# Patient Record
Sex: FEMALE | Race: WHITE | NOT HISPANIC OR LATINO | Employment: PART TIME | URBAN - METROPOLITAN AREA
[De-identification: names, ages, dates, MRNs, and addresses within clinical notes are randomized per-mention and may not be internally consistent; named-entity substitution may affect disease eponyms.]

---

## 2017-10-05 ENCOUNTER — HOSPITAL ENCOUNTER (EMERGENCY)
Facility: CLINIC | Age: 47
Discharge: HOME | End: 2017-10-05
Attending: EMERGENCY MEDICINE | Admitting: EMERGENCY MEDICINE
Payer: COMMERCIAL

## 2017-10-05 ENCOUNTER — APPOINTMENT (OUTPATIENT)
Dept: GENERAL RADIOLOGY | Facility: CLINIC | Age: 47
End: 2017-10-05
Attending: EMERGENCY MEDICINE
Payer: COMMERCIAL

## 2017-10-05 VITALS
WEIGHT: 293 LBS | BODY MASS INDEX: 49.13 KG/M2 | SYSTOLIC BLOOD PRESSURE: 147 MMHG | RESPIRATION RATE: 18 BRPM | DIASTOLIC BLOOD PRESSURE: 86 MMHG | HEART RATE: 88 BPM | OXYGEN SATURATION: 96 % | TEMPERATURE: 98.3 F

## 2017-10-05 DIAGNOSIS — R07.9 CHEST PAIN, UNSPECIFIED TYPE: ICD-10-CM

## 2017-10-05 DIAGNOSIS — R03.0 ELEVATED BLOOD PRESSURE READING WITHOUT DIAGNOSIS OF HYPERTENSION: ICD-10-CM

## 2017-10-05 LAB
ANION GAP SERPL CALCULATED.3IONS-SCNC: 9 MMOL/L (ref 3–14)
BUN SERPL-MCNC: 12 MG/DL (ref 7–30)
CALCIUM SERPL-MCNC: 8.9 MG/DL (ref 8.5–10.1)
CHLORIDE SERPL-SCNC: 107 MMOL/L (ref 94–109)
CO2 SERPL-SCNC: 25 MMOL/L (ref 20–32)
CREAT SERPL-MCNC: 0.71 MG/DL (ref 0.52–1.04)
D DIMER PPP FEU-MCNC: 0.3 UG/ML FEU (ref 0–0.5)
ERYTHROCYTE [DISTWIDTH] IN BLOOD BY AUTOMATED COUNT: 12 % (ref 10–15)
GFR SERPL CREATININE-BSD FRML MDRD: 88 ML/MIN/1.7M2
GLUCOSE SERPL-MCNC: 106 MG/DL (ref 70–99)
HCG SERPL QL: NEGATIVE
HCT VFR BLD AUTO: 42.7 % (ref 35–47)
HGB BLD-MCNC: 14.7 G/DL (ref 11.7–15.7)
INTERPRETATION ECG - MUSE: NORMAL
MCH RBC QN AUTO: 34.2 PG (ref 26.5–33)
MCHC RBC AUTO-ENTMCNC: 34.4 G/DL (ref 31.5–36.5)
MCV RBC AUTO: 99 FL (ref 78–100)
PLATELET # BLD AUTO: 240 10E9/L (ref 150–450)
POTASSIUM SERPL-SCNC: 3.9 MMOL/L (ref 3.4–5.3)
RBC # BLD AUTO: 4.3 10E12/L (ref 3.8–5.2)
SODIUM SERPL-SCNC: 141 MMOL/L (ref 133–144)
TROPONIN I SERPL-MCNC: <0.015 UG/L (ref 0–0.04)
WBC # BLD AUTO: 7.5 10E9/L (ref 4–11)

## 2017-10-05 PROCEDURE — 84484 ASSAY OF TROPONIN QUANT: CPT | Performed by: EMERGENCY MEDICINE

## 2017-10-05 PROCEDURE — 80048 BASIC METABOLIC PNL TOTAL CA: CPT | Performed by: EMERGENCY MEDICINE

## 2017-10-05 PROCEDURE — 85027 COMPLETE CBC AUTOMATED: CPT | Performed by: EMERGENCY MEDICINE

## 2017-10-05 PROCEDURE — 93005 ELECTROCARDIOGRAM TRACING: CPT

## 2017-10-05 PROCEDURE — 84703 CHORIONIC GONADOTROPIN ASSAY: CPT | Performed by: EMERGENCY MEDICINE

## 2017-10-05 PROCEDURE — 85379 FIBRIN DEGRADATION QUANT: CPT | Performed by: EMERGENCY MEDICINE

## 2017-10-05 PROCEDURE — 99285 EMERGENCY DEPT VISIT HI MDM: CPT | Mod: 25

## 2017-10-05 PROCEDURE — 71020 XR CHEST 2 VW: CPT

## 2017-10-05 RX ORDER — LORATADINE 10 MG/1
10 TABLET ORAL DAILY
COMMUNITY

## 2017-10-05 NOTE — ED PROVIDER NOTES
History     Chief Complaint:  Chest pain    HPI   Shital Krueger is a 47 year old female who presents with right sided chest pain x 2 weeks. Patient states that she had the gradual onset of right sided chest pain in the absence of any other inciting event. The pain radiates from the right chest to the right axilla. The pain is sharp and relatively constant. There are no obvious aggravating or alleviating factors other than a very specific any area of tenderness to touch. She has no associated dyspnea or nausea. She has no history of DVT/PE, immobilization, malignancy, hemoptysis, estrogen use or malignancy.    Allergies:  Penicillins      Medications:    Claritin    Past Medical History:    The patient does not have any past pertinent medical history.     Past Surgical History:    History reviewed. No pertinent surgical history.     Family History:    Glaucoma  Thyroid disease  Hyperlipidemia  Heart failure    Social History:  Smoking status: Never smoker  Alcohol use: Yes    Marital Status:        Review of Systems   Constitutional: Negative for fever.   Respiratory: Negative for shortness of breath.    Cardiovascular: Negative for leg swelling.   Gastrointestinal: Negative for nausea and vomiting.   Skin: Negative for rash.   All other systems reviewed and are negative.    10 point review of systems performed and is negative except as above and in HPI.       Physical Exam     Patient Vitals for the past 24 hrs:   BP Temp Pulse Heart Rate Resp SpO2 Weight   10/05/17 1330 147/86 - 88 88 18 96 % -   10/05/17 1121 (!) 180/104 98.3  F (36.8  C) 100 - 20 98 % 132.9 kg (293 lb)        Physical Exam    General:   Pleasant, age appropriate.  HEENT:    Oropharynx is moist, without lesions or trismus.  Eyes:    Conjunctiva normal  Neck:    Supple, no meningismus.     CV:     Regular rate and rhythm.      No murmurs, rubs or gallops.       No JVD or unilateral leg swelling.       2+ radial pulses bilateral.        No lower extremity edema.  PULM:    Clear to auscultation bilateral.       No respiratory distress.      Good air exchange.     No rales or wheezing.     Moderate focal tenderness to the chest wall at the right 8th rib at the anterior axillary line that reproduces her pain.  ABD:    Soft, non-tender, non-distended.       No pulsatile masses.       No rebound, guarding or rigidity.  MSK:     No gross deformity to all four extremities.   LYMPH:   No cervical lymphadenopathy.  NEURO:   Alert. Good muscle tone, no atrophy.  Skin:    Warm, dry and intact.    Psych:    Mood is good and affect is appropriate.        Emergency Department Course     ECG (11:31:44):  Rate 89 bpm. WV interval 138. QRS duration 84. QT/QTc 348/423. P-R-T axes 54 0 29. Sinus rhythm. Possible inferior infarct. Interpreted by Dr. Marks.    Imaging:  Radiographic findings were communicated with the patient who voiced understanding of the findings.    X-ray Chest, 2 views:  Negative.  Result per radiology.     Laboratory:  1201 - Troponin: <0.015   CBC: WNL (WBC 7.5, HGB 14.7, )    BMP: Glucose 106 (H), o/w WNL (Creatinine 0.71)   D-dimer: 0.3  HCG: Negative    Emergency Department Course:  Past medical records, nursing notes, and vitals reviewed.  I performed an exam of the patient and obtained history, as documented above.   IV inserted and blood drawn.     The patient was sent for a X-ray while in the emergency department, findings above.     I rechecked the patient. Findings and plan explained to the Patient. Patient discharged home with instructions regarding supportive care, medications, and reasons to return. The importance of close follow-up was reviewed.      Impression & Plan      Medical Decision Makin yo F who presents with a 2 week history of right sided chest pain and reproducible pain on examination. Evaluation is negative for ACS. Low risk for PE and negative D dimer thus no indication for chest CT. Nothing to  suggest aortic dissection or aneurysm.  Given the reproducible pain, likely represents musculoskeletal source. Patient safe for discharge and close follow up with PCP.    Diagnosis:    ICD-10-CM    1. Chest pain, unspecified type R07.9    2. Elevated blood pressure reading without diagnosis of hypertension R03.0        Yang Sow  10/5/2017   [facility] EMERGENCY DEPARTMENT  I, Yang Sow, am serving as a scribe at 2:18 PM on 10/5/2017 to document services personally performed by Dr. Marks based on my observations and the provider's statements to me.       Collins Marks MD  10/09/17 1051

## 2017-10-05 NOTE — ED AVS SNAPSHOT
[facility] Emergency Department    [address]    [facility] [address]    Phone:  [phone]    Fax:  [phone]                                       Shital Krueger   MRN: [id number]    Department:  [facility] Emergency Department   Date of Visit:  10/5/2017           After Visit Summary Signature Page     I have received my discharge instructions, and my questions have been answered. I have discussed any challenges I see with this plan with the nurse or doctor.    ..........................................................................................................................................  Patient/Patient Representative Signature      ..........................................................................................................................................  Patient Representative Print Name and Relationship to Patient    ..................................................               ................................................  Date                                            Time    ..........................................................................................................................................  Reviewed by Signature/Title    ...................................................              ..............................................  Date                                                            Time

## 2017-10-05 NOTE — ED NOTES
Reviewed discharge instructions with pt, IV access discontinued, pt verbalized understanding and denied any further questions, pt discharged.

## 2017-10-05 NOTE — DISCHARGE INSTRUCTIONS
Discharge Instructions  Chest Pain    You have been seen today for chest pain or discomfort.  At this time, your provider has found no signs that your chest pain is due to a serious or life-threatening condition, (or you have declined more testing and/or admission to the hospital). However, sometimes there is a serious problem that does not show up right away. Your evaluation today may not be complete and you may need further testing and evaluation.     Generally, every Emergency Department visit should have a follow-up clinic visit with either a primary or a specialty clinic/provider. Please follow-up as instructed by your emergency provider today.  Return to the Emergency Department if:    Your chest pain changes, gets worse, starts to happen more often, or comes with less activity.    You are newly short of breath.    You get very weak or tired.    You pass out or faint.    You have any new symptoms, like fever, cough, numb legs, or you cough up blood.    You have anything else that worries you.    Until you follow-up with your regular provider, please do the following:    Take one aspirin daily unless you have an allergy or are told not to by your provider.    If a stress test appointment has been made, go to the appointment.    If you have questions, contact your regular provider.    Follow-up with your regular provider/clinic as directed; this is very important.    If you were given a prescription for medicine here today, be sure to read all of the information (including the package insert) that comes with your prescription.  This will include important information about the medicine, its side effects, and any warnings that you need to know about.  The pharmacist who fills the prescription can provide more information and answer questions you may have about the medicine.  If you have questions or concerns that the pharmacist cannot address, please call or return to the Emergency Department.       Remember that  you can always come back to the Emergency Department if you are not able to see your regular provider in the amount of time listed above, if you get any new symptoms, or if there is anything that worries you.  Discharge Instructions  Hypertension - High Blood Pressure    During you visit to the Emergency Department, your blood pressure was higher than the recommended blood pressure.  This may be related to stress, pain, medication or other temporary conditions. In these cases, your blood pressure may return to normal on its own. If you have a history of high blood pressure, you may need to have your provider adjust your medications. Sometimes, your high measurement here may indicate that you have developed high blood pressure that will stay high unless it is treated. As a general rule, high blood pressure causes problems over years rather than days, weeks, or months. So, while it is important to treat blood pressure, it is rarely important to treat blood pressure immediately. Occasionally we will begin a medication in the Emergency Department; more often we will recommend close follow-up for medications with a primary doctor/clinic.    Generally, every Emergency Department visit should have a follow-up clinic visit with either a primary or a specialty clinic/provider. Please follow-up as instructed by your emergency provider today.    Return to the Emergency Department if you start to have:    A severe headache.    Chest pain.    Shortness of breath.    Weakness or numbness that affects one part of the body.    Confusion.    Vision changes.    Significant swelling of legs and/or eyes.    A reaction to any medication started in the Emergency Department.    What can I do to help myself?    Avoid alcohol.    Take any blood pressure medicine that you are prescribed.    Get a good night s sleep.    Lower your salt intake.    Exercise.    Lose weight.    Manage stress.    See your doctor regularly    If blood pressure  medication was started in the Emergency Department:    The medicine may not have an immediate effect. The body and brain determine what blood pressure you have. The medicine s job is to retrain the body s  thermostat  to a lower blood pressure.    You will need to follow up with your provider to see how this medicine is working for you.  If you were given a prescription for medicine here today, be sure to read all of the information (including the package insert) that comes with your prescription.  This will include important information about the medicine, its side effects, and any warnings that you need to know about.  The pharmacist who fills the prescription can provide more information and answer questions you may have about the medicine.  If you have questions or concerns that the pharmacist cannot address, please call or return to the Emergency Department.   Remember that you can always come back to the Emergency Department if you are not able to see your regular provider in the amount of time listed above, if you get any new symptoms, or if there is anything that worries you.

## 2017-10-05 NOTE — ED AVS SNAPSHOT
[facility] Emergency Department    [address]    [facility] [address]    Phone:  [phone]    Fax:  [phone]                                       Shital Krueger   MRN: [id number]    Department:  [facility] Emergency Department   Date of Visit:  10/5/2017           Patient Information     Date Of Birth          1970        Your diagnoses for this visit were:     Chest pain, unspecified type     Elevated blood pressure reading without diagnosis of hypertension        You were seen by Collins Marks MD.      Follow-up Information     Follow up with Cosmo Esteves DO. Schedule an appointment as soon as possible for a visit in 4 days.    Specialty:  Family Practice    Contact information:    [facility]  [id number]  [facility] [address] [phone]          Discharge Instructions       Discharge Instructions  Chest Pain    You have been seen today for chest pain or discomfort.  At this time, your provider has found no signs that your chest pain is due to a serious or life-threatening condition, (or you have declined more testing and/or admission to the hospital). However, sometimes there is a serious problem that does not show up right away. Your evaluation today may not be complete and you may need further testing and evaluation.     Generally, every Emergency Department visit should have a follow-up clinic visit with either a primary or a specialty clinic/provider. Please follow-up as instructed by your emergency provider today.  Return to the Emergency Department if:    Your chest pain changes, gets worse, starts to happen more often, or comes with less activity.    You are newly short of breath.    You get very weak or tired.    You pass out or faint.    You have any new symptoms, like fever, cough, numb legs, or you cough up blood.    You have anything else that worries you.    Until you follow-up with your regular  provider, please do the following:    Take one aspirin daily unless you have an allergy or are told not to by your provider.    If a stress test appointment has been made, go to the appointment.    If you have questions, contact your regular provider.    Follow-up with your regular provider/clinic as directed; this is very important.    If you were given a prescription for medicine here today, be sure to read all of the information (including the package insert) that comes with your prescription.  This will include important information about the medicine, its side effects, and any warnings that you need to know about.  The pharmacist who fills the prescription can provide more information and answer questions you may have about the medicine.  If you have questions or concerns that the pharmacist cannot address, please call or return to the Emergency Department.       Remember that you can always come back to the Emergency Department if you are not able to see your regular provider in the amount of time listed above, if you get any new symptoms, or if there is anything that worries you.  Discharge Instructions  Hypertension - High Blood Pressure    During you visit to the Emergency Department, your blood pressure was higher than the recommended blood pressure.  This may be related to stress, pain, medication or other temporary conditions. In these cases, your blood pressure may return to normal on its own. If you have a history of high blood pressure, you may need to have your provider adjust your medications. Sometimes, your high measurement here may indicate that you have developed high blood pressure that will stay high unless it is treated. As a general rule, high blood pressure causes problems over years rather than days, weeks, or months. So, while it is important to treat blood pressure, it is rarely important to treat blood pressure immediately. Occasionally we will begin a medication in the Emergency  Department; more often we will recommend close follow-up for medications with a primary doctor/clinic.    Generally, every Emergency Department visit should have a follow-up clinic visit with either a primary or a specialty clinic/provider. Please follow-up as instructed by your emergency provider today.    Return to the Emergency Department if you start to have:    A severe headache.    Chest pain.    Shortness of breath.    Weakness or numbness that affects one part of the body.    Confusion.    Vision changes.    Significant swelling of legs and/or eyes.    A reaction to any medication started in the Emergency Department.    What can I do to help myself?    Avoid alcohol.    Take any blood pressure medicine that you are prescribed.    Get a good night s sleep.    Lower your salt intake.    Exercise.    Lose weight.    Manage stress.    See your doctor regularly    If blood pressure medication was started in the Emergency Department:    The medicine may not have an immediate effect. The body and brain determine what blood pressure you have. The medicine s job is to retrain the body s  thermostat  to a lower blood pressure.    You will need to follow up with your provider to see how this medicine is working for you.  If you were given a prescription for medicine here today, be sure to read all of the information (including the package insert) that comes with your prescription.  This will include important information about the medicine, its side effects, and any warnings that you need to know about.  The pharmacist who fills the prescription can provide more information and answer questions you may have about the medicine.  If you have questions or concerns that the pharmacist cannot address, please call or return to the Emergency Department.   Remember that you can always come back to the Emergency Department if you are not able to see your regular provider in the amount of time listed above, if you get any new  symptoms, or if there is anything that worries you.      24 Hour Appointment Hotline       To make an appointment at any [facility], call [phone] ([phone]). If you don't have a family doctor or clinic, we will help you find one. [facility] clinics are conveniently located to serve the needs of you and your family.             Review of your medicines      Our records show that you are taking the medicines listed below. If these are incorrect, please call your family doctor or clinic.        Dose / Directions Last dose taken    * JOLIVETTE 0.35 MG per tablet   Dose:  1 tablet   Generic drug:  norethindrone        Take 1 tablet by mouth daily   Refills:  3        * norethindrone 0.35 MG per tablet   Commonly known as:  MICRONOR   Dose:  1 tablet   Quantity:  84 tablet        Take 1 tablet (0.35 mg) by mouth daily   Refills:  1        LO/OVRAL (21) OR        Refills:  0        loratadine 10 MG tablet   Commonly known as:  CLARITIN   Dose:  10 mg        Take 10 mg by mouth daily   Refills:  0        metroNIDAZOLE 0.75 % cream   Commonly known as:  METROCREAM        Refills:  6        * Notice:  This list has 2 medication(s) that are the same as other medications prescribed for you. Read the directions carefully, and ask your doctor or other care provider to review them with you.            Procedures and tests performed during your visit     Basic metabolic panel (BMP)    CBC (platelets, no diff)    Chest XR,  PA & LAT    D dimer quantitative    EKG 12 lead    HCG QUALitative pregnancy (blood)    Peripheral IV catheter    Troponin I      Orders Needing Specimen Collection     None      Pending Results     No orders found from 10/3/2017 to 10/6/2017.            Pending Culture Results     No orders found from 10/3/2017 to 10/6/2017.            Pending Results Instructions     If you had any lab results that were not finalized at the time of your Discharge, you can call the ED Lab Result RN at  [phone]. You will be contacted by this team for any positive Lab results or changes in treatment. The nurses are available 7 days a week from 10A to 6:30P.  You can leave a message 24 hours per day and they will return your call.        Test Results From Your Hospital Stay        10/5/2017 12:13 PM      Component Results     Component Value Ref Range & Units Status    WBC 7.5 4.0 - 11.0 10e9/L Final    RBC Count 4.30 3.8 - 5.2 10e12/L Final    Hemoglobin 14.7 11.7 - 15.7 g/dL Final    Hematocrit 42.7 35.0 - 47.0 % Final    MCV 99 78 - 100 fl Final    MCH 34.2 (H) 26.5 - 33.0 pg Final    MCHC 34.4 31.5 - 36.5 g/dL Final    RDW 12.0 10.0 - 15.0 % Final    Platelet Count 240 150 - 450 10e9/L Final         10/5/2017 12:32 PM      Component Results     Component Value Ref Range & Units Status    Sodium 141 133 - 144 mmol/L Final    Potassium 3.9 3.4 - 5.3 mmol/L Final    Chloride 107 94 - 109 mmol/L Final    Carbon Dioxide 25 20 - 32 mmol/L Final    Anion Gap 9 3 - 14 mmol/L Final    Glucose 106 (H) 70 - 99 mg/dL Final    Urea Nitrogen 12 7 - 30 mg/dL Final    Creatinine 0.71 0.52 - 1.04 mg/dL Final    GFR Estimate 88 >60 mL/min/1.7m2 Final    Non  GFR Calc    GFR Estimate If Black >90 >60 mL/min/1.7m2 Final    African American GFR Calc    Calcium 8.9 8.5 - 10.1 mg/dL Final         10/5/2017 12:32 PM      Component Results     Component Value Ref Range & Units Status    D Dimer 0.3 0.0 - 0.50 ug/ml FEU Final    This D-dimer assay is intended for use in conjunction with a clinical pretest   probability assessment model to exclude pulmonary embolism (PE) and deep   venous thrombosis (DVT) in outpatients suspected of PE or DVT. The cut-off   value is 0.5 ug/mL FEU.           10/5/2017 12:38 PM      Component Results     Component Value Ref Range & Units Status    HCG Qualitative Serum Negative NEG^Negative Final    This test is for screening purposes.  Results should be interpreted along with   the  clinical picture.  Confirmation testing is available if warranted by   ordering VRF285, HCG Quantitative Pregnancy.           10/5/2017 12:32 PM      Component Results     Component Value Ref Range & Units Status    Troponin I ES <0.015 0.000 - 0.045 ug/L Final    The 99th percentile for upper reference range is 0.045 ug/L.  Troponin values   in the range of 0.045 - 0.120 ug/L may be associated with risks of adverse   clinical events.           10/5/2017  1:14 PM      Narrative     XR CHEST 2 VW 10/5/2017 1:13 PM    HISTORY: chest pain        Impression     IMPRESSION: Negative.    PEDRO LUIS HERRERA MD                Clinical Quality Measure: Blood Pressure Screening     Your blood pressure was checked while you were in the emergency department today. The last reading we obtained was  BP: (!) 180/104 . Please read the guidelines below about what these numbers mean and what you should do about them.  If your systolic blood pressure (the top number) is less than 120 and your diastolic blood pressure (the bottom number) is less than 80, then your blood pressure is normal. There is nothing more that you need to do about it.  If your systolic blood pressure (the top number) is 120-139 or your diastolic blood pressure (the bottom number) is 80-89, your blood pressure may be higher than it should be. You should have your blood pressure rechecked within a year by a primary care provider.  If your systolic blood pressure (the top number) is 140 or greater or your diastolic blood pressure (the bottom number) is 90 or greater, you may have high blood pressure. High blood pressure is treatable, but if left untreated over time it can put you at risk for heart attack, stroke, or kidney failure. You should have your blood pressure rechecked by a primary care provider within the next 4 weeks.  If your provider in the emergency department today gave you specific instructions to follow-up with your doctor or provider even sooner than  "that, you should follow that instruction and not wait for up to 4 weeks for your follow-up visit.        Thank you for choosing [facility]       Thank you for choosing [facility] for your care. Our goal is always to provide you with excellent care. Hearing back from our patients is one way we can continue to improve our services. Please take a few minutes to complete the written survey that you may receive in the mail after you visit with us. Thank you!        [facility] Information     [facility] lets you send messages to your doctor, view your test results, renew your prescriptions, schedule appointments and more. To sign up, go to [email or web address] . Click on \"Log in\" on the left side of the screen, which will take you to the Welcome page. Then click on \"Sign up Now\" on the right side of the page.     You will be asked to enter the access code listed below, as well as some personal information. Please follow the directions to create your username and password.     Your access code is: [email or web address]  Expires: 1/3/2018  1:30 PM     Your access code will  in 90 days. If you need help or a new code, please call your [facility] clinic or [phone].        Care EveryWhere ID     This is your Care EveryWhere ID. This could be used by other organizations to access your [facility] medical records  [id number]        Equal Access to Services     SY KELLEY : eKlsi Sandoval, waaxda luqadaha, qaybta kaalnevaeh montiel, zac ryder. So [facility] [phone].    ATENCIÓN: Si habla español, tiene a simental disposición servicios gratuitos de asistencia lingüística. Markel al [phone].    We comply with applicable federal civil rights laws and [place] laws. We do not discriminate on the basis of race, color, national origin, age, disability, sex, sexual orientation, or gender identity.            After Visit Summary       This is your record. Keep this with you and show to your " community pharmacist(s) and doctor(s) at your next visit.

## 2017-10-09 ASSESSMENT — ENCOUNTER SYMPTOMS
FEVER: 0
SHORTNESS OF BREATH: 0
VOMITING: 0
NAUSEA: 0

## 2017-12-12 ENCOUNTER — TRANSFERRED RECORDS (OUTPATIENT)
Dept: HEALTH INFORMATION MANAGEMENT | Facility: CLINIC | Age: 47
End: 2017-12-12

## 2017-12-15 ENCOUNTER — TRANSFERRED RECORDS (OUTPATIENT)
Dept: HEALTH INFORMATION MANAGEMENT | Facility: CLINIC | Age: 47
End: 2017-12-15

## 2017-12-29 ENCOUNTER — OFFICE VISIT (OUTPATIENT)
Dept: OBGYN | Facility: CLINIC | Age: 47
End: 2017-12-29
Payer: COMMERCIAL

## 2017-12-29 VITALS
WEIGHT: 258 LBS | DIASTOLIC BLOOD PRESSURE: 78 MMHG | HEART RATE: 80 BPM | HEIGHT: 65 IN | SYSTOLIC BLOOD PRESSURE: 122 MMHG | BODY MASS INDEX: 42.99 KG/M2

## 2017-12-29 DIAGNOSIS — Z30.011 OCP (ORAL CONTRACEPTIVE PILLS) INITIATION: ICD-10-CM

## 2017-12-29 DIAGNOSIS — Z01.419 ENCOUNTER FOR GYNECOLOGICAL EXAMINATION WITHOUT ABNORMAL FINDING: Primary | ICD-10-CM

## 2017-12-29 PROCEDURE — 99396 PREV VISIT EST AGE 40-64: CPT | Performed by: OBSTETRICS & GYNECOLOGY

## 2017-12-29 PROCEDURE — 87624 HPV HI-RISK TYP POOLED RSLT: CPT | Performed by: OBSTETRICS & GYNECOLOGY

## 2017-12-29 PROCEDURE — G0145 SCR C/V CYTO,THINLAYER,RESCR: HCPCS | Performed by: OBSTETRICS & GYNECOLOGY

## 2017-12-29 RX ORDER — ACETAMINOPHEN AND CODEINE PHOSPHATE 120; 12 MG/5ML; MG/5ML
1 SOLUTION ORAL DAILY
Qty: 90 TABLET | Refills: 3 | Status: SHIPPED | OUTPATIENT
Start: 2017-12-29 | End: 2022-02-15

## 2017-12-29 ASSESSMENT — ANXIETY QUESTIONNAIRES
5. BEING SO RESTLESS THAT IT IS HARD TO SIT STILL: NOT AT ALL
2. NOT BEING ABLE TO STOP OR CONTROL WORRYING: NOT AT ALL
6. BECOMING EASILY ANNOYED OR IRRITABLE: SEVERAL DAYS
1. FEELING NERVOUS, ANXIOUS, OR ON EDGE: NOT AT ALL
IF YOU CHECKED OFF ANY PROBLEMS ON THIS QUESTIONNAIRE, HOW DIFFICULT HAVE THESE PROBLEMS MADE IT FOR YOU TO DO YOUR WORK, TAKE CARE OF THINGS AT HOME, OR GET ALONG WITH OTHER PEOPLE: NOT DIFFICULT AT ALL
3. WORRYING TOO MUCH ABOUT DIFFERENT THINGS: NOT AT ALL
7. FEELING AFRAID AS IF SOMETHING AWFUL MIGHT HAPPEN: NOT AT ALL
GAD7 TOTAL SCORE: 1

## 2017-12-29 ASSESSMENT — PATIENT HEALTH QUESTIONNAIRE - PHQ9
SUM OF ALL RESPONSES TO PHQ QUESTIONS 1-9: 2
5. POOR APPETITE OR OVEREATING: NOT AT ALL

## 2017-12-29 NOTE — PROGRESS NOTES
Shital is a 47 year old No obstetric history on file. female who presents for annual exam.     Besides routine health maintenance, she has no other health concerns today .    HPI:  The patient's PCP is  Cosmo Esteves DO.    No GYN complaints. Started diet and lost 40lbs.  with high TGL so eating at home has changed.  Needs birth control. Hx of micronor. Interested in going back on. Doesn't know if it was causing any weight gain.        GYNECOLOGIC HISTORY:    Patient's last menstrual period was 12/11/2017.  Her current contraception method is: condoms.  She  reports that she has never smoked. She has never used smokeless tobacco.      Patient is sexually active.  STD testing offered?  Declined  Last PHQ-9 score on record =   PHQ-9 SCORE 12/29/2017   Total Score 2     Last GAD7 score on record =   SHERRY-7 SCORE 12/29/2017   Total Score 1     Alcohol Score = rare use    HEALTH MAINTENANCE:  Cholesterol: done at PCP  Last Mammo: 2 years ago, Result: normal, Next Mammo: 1 year.  Pap: (  Lab Results   Component Value Date    PAP NIL 11/02/2015     Colonoscopy:  never, Result: not applicable, Next Colonoscopy: age 51.  Dexa:  never    Health maintenance updated:  yes    HISTORY:  Obstetric History     No data available          Patient Active Problem List   Diagnosis   (none) - all problems resolved or deleted     No past surgical history on file.   Social History   Substance Use Topics     Smoking status: Never Smoker     Smokeless tobacco: Never Used     Alcohol use Yes      Problem (# of Occurrences) Relation (Name,Age of Onset)    Breast Cancer (1) Cousin    CEREBROVASCULAR DISEASE (1) Maternal Grandmother    Glaucoma (1) Mother    Heart Failure (1) Father    Hyperlipidemia (2) Mother, Father    Other Cancer (1) Cousin    Ovarian Cancer (1) Cousin    Thyroid Disease (1) Mother            Current Outpatient Prescriptions   Medication Sig     norethindrone (MICRONOR) 0.35 MG per tablet Take 1 tablet (0.35  "mg) by mouth daily     loratadine (CLARITIN) 10 MG tablet Take 10 mg by mouth daily     metroNIDAZOLE (METROCREAM) 0.75 % cream      [DISCONTINUED] norethindrone (MICRONOR) 0.35 MG per tablet Take 1 tablet (0.35 mg) by mouth daily     [DISCONTINUED] JOLIVETTE 0.35 MG tablet Take 1 tablet by mouth daily     No current facility-administered medications for this visit.      Allergies   Allergen Reactions     Penicillins        Past medical, surgical, social and family histories were reviewed and updated in EPIC.    ROS:   12 point review of systems negative other than symptoms noted below.    EXAM:  /78  Pulse 80  Ht 5' 4.75\" (1.645 m)  Wt 258 lb (117 kg)  LMP 12/11/2017  BMI 43.27 kg/m2   BMI: Body mass index is 43.27 kg/(m^2).    PHYSICAL EXAM:  Constitutional:  Appearance: Well nourished, well developed, alert, in no acute distress  Neck:  Lymph Nodes:  No lymphadenopathy present    Thyroid:  Gland size normal, nontender, no nodules or masses present  on palpation  Chest:  Respiratory Effort:  Breathing unlabored  Cardiovascular:    Heart: Auscultation:  Regular rate, normal rhythm, no murmurs present  Breasts: Inspection of Breasts:  No lymphadenopathy present., Palpation of Breasts and Axillae:  No masses present on palpation, no breast tenderness., Axillary Lymph Nodes:  No lymphadenopathy present. and No nodularity, asymmetry or nipple discharge bilaterally.  Gastrointestinal:   Abdominal Examination:  Abdomen nontender to palpation, tone normal without rigidity or guarding, no masses present, umbilicus without lesions   Liver and Spleen:  No hepatomegaly present, liver nontender to palpation    Hernias:  No hernias present  Lymphatic: Lymph Nodes:  No other lymphadenopathy present  Skin:  General Inspection:  No rashes present, no lesions present, no areas of  discoloration    Genitalia and Groin:  No rashes present, no lesions present, no areas of  discoloration, no masses " present  Neurologic/Psychiatric:    Mental Status:  Oriented X3     Pelvic Exam:  External Genitalia:     Normal appearance for age, no discharge present, no tenderness present, no inflammatory lesions present, color normal  Vagina:     Normal vaginal vault without central or paravaginal defects, no discharge present, no inflammatory lesions present, no masses present  Bladder:     Nontender to palpation  Urethra:   Urethral Body:  Urethra palpation normal, urethra structural support normal   Urethral Meatus:  No erythema or lesions present  Cervix:     Appearance healthy, no lesions present, nontender to palpation, no bleeding present  Uterus:     Uterus: firm, normal sized and nontender, anteverted in position.   Adnexa:     No adnexal tenderness present, no adnexal masses present  Perineum:     Perineum within normal limits, no evidence of trauma, no rashes or skin lesions present  Anus:     Anus within normal limits, no hemorrhoids present  Inguinal Lymph Nodes:     No lymphadenopathy present  Pubic Hair:     Normal pubic hair distribution for age  Genitalia and Groin:     No rashes present, no lesions present, no areas of discoloration, no masses present      COUNSELING:   Reviewed preventive health counseling, as reflected in patient instructions       Regular exercise    BMI: Body mass index is 43.27 kg/(m^2).  Weight management plan: Encouraged continued weight loss    ASSESSMENT:  47 year old female with satisfactory annual exam.    ICD-10-CM    1. Encounter for gynecological examination without abnormal finding Z01.419 Pap imaged thin layer screen with HPV - recommended age 30 - 65     HPV High Risk Types DNA Cervical   2. OCP (oral contraceptive pills) initiation Z30.011 norethindrone (MICRONOR) 0.35 MG per tablet       PLAN:  Resart micronor. If weight goes up consider IUD    Calderon Quezada MD

## 2017-12-29 NOTE — NURSING NOTE
Please abstract the following data from this visit with this patient into the appropriate field in Epic:    Mammogram done on this date: 2 weeks ago (approximately), by this group: [facility], results were normal.

## 2017-12-29 NOTE — LETTER
January 8, 2018    Shital Krueger  [address] [facility] [address]    Dear Shital,  We are happy to inform you that your PAP smear result from 12/29/17 is normal.  We are now able to do a follow up test on PAP smears. The DNA test is for HPV (Human Papilloma Virus). Cervical cancer is closely linked with certain types of HPV. Your result showed no evidence of high risk HPV.  Therefore we recommend you return in 3 years for your next pap smear.  You will still need to return to the clinic every year for an annual exam and other preventive tests.  Please contact the clinic at [phone] with any questions.  Sincerely,    Calderon Quezada MD/jeannie

## 2017-12-29 NOTE — MR AVS SNAPSHOT
"              After Visit Summary   2017    Shital Krueger    MRN: [id number]           Patient Information     Date Of Birth          1970        Visit Information        Provider Department      2017 10:20 AM Calderon Quezada MD [facility]        Today's Diagnoses     Encounter for gynecological examination without abnormal finding    -  1    OCP (oral contraceptive pills) initiation           Follow-ups after your visit        Who to contact     If you have questions or need follow up information about today's clinic visit or your schedule please contact [facility] directly at [phone].  Normal or non-critical lab and imaging results will be communicated to you by Syscorhart, letter or phone within 4 business days after the clinic has received the results. If you do not hear from us within 7 days, please contact the clinic through Syscorhart or phone. If you have a critical or abnormal lab result, we will notify you by phone as soon as possible.  Submit refill requests through TermScout or call your pharmacy and they will forward the refill request to us. Please allow 3 business days for your refill to be completed.          Additional Information About Your Visit        MyChart Information     TermScout lets you send messages to your doctor, view your test results, renew your prescriptions, schedule appointments and more. To sign up, go to [email or web address] . Click on \"Log in\" on the left side of the screen, which will take you to the Welcome page. Then click on \"Sign up Now\" on the right side of the page.     You will be asked to enter the access code listed below, as well as some personal information. Please follow the directions to create your username and password.     Your access code is: [email or web address]  Expires: 1/3/2018 12:30 PM     Your access code will  in 90 days. If you need help or a new code, please call your [place] clinic or " "[phone].        Care EveryWhere ID     This is your Care EveryWhere ID. This could be used by other organizations to access your [place] medical records  [id number]        Your Vitals Were     Pulse Height Last Period BMI (Body Mass Index)          80 5' 4.75\" (1.645 m) 12/11/2017 43.27 kg/m2         Blood Pressure from Last 3 Encounters:   12/29/17 122/78   10/05/17 147/86   12/07/15 120/76    Weight from Last 3 Encounters:   12/29/17 258 lb (117 kg)   10/05/17 293 lb (132.9 kg)   12/07/15 271 lb (122.9 kg)              We Performed the Following     HPV High Risk Types DNA Cervical     Pap imaged thin layer screen with HPV - recommended age 30 - 65          Today's Medication Changes          These changes are accurate as of: 12/29/17 12:33 PM.  If you have any questions, ask your nurse or doctor.               Start taking these medicines.        Dose/Directions    norethindrone 0.35 MG per tablet   Commonly known as:  MICRONOR   Used for:  OCP (oral contraceptive pills) initiation   Started by:  Calderon Quezada MD        Dose:  1 tablet   Take 1 tablet (0.35 mg) by mouth daily   Quantity:  90 tablet   Refills:  3            Where to get your medicines      These medications were sent to [facility]/pharmacy #[id number] - [place], [place] - [address]  [address] [facility] [address]     Phone:  [phone]     norethindrone 0.35 MG per tablet                Primary Care Provider Office Phone # Fax #    Cosmo STEPHANE Esteves [phone]       [facility] [address] [facility] [address]        Equal Access to Services     YS KELLEY AH: Hadbinu Sandoval, melania lumary, qaybta kaalmada tiana, zac ryder. So [facility] [phone].    ATENCIÓN: Si habla español, tiene a simental disposición servicios gratuitos de asistencia lingüística. Llame al [phone].    We comply with applicable federal civil rights laws and " [place] laws. We do not discriminate on the basis of race, color, national origin, age, disability, sex, sexual orientation, or gender identity.            Thank you!     Thank you for choosing [facility]  for your care. Our goal is always to provide you with excellent care. Hearing back from our patients is one way we can continue to improve our services. Please take a few minutes to complete the written survey that you may receive in the mail after your visit with us. Thank you!             Your Updated Medication List - Protect others around you: Learn how to safely use, store and throw away your medicines at www.disposemymeds.org.          This list is accurate as of: 12/29/17 12:33 PM.  Always use your most recent med list.                   Brand Name Dispense Instructions for use Diagnosis    loratadine 10 MG tablet    CLARITIN     Take 10 mg by mouth daily        metroNIDAZOLE 0.75 % cream    METROCREAM          norethindrone 0.35 MG per tablet    MICRONOR    90 tablet    Take 1 tablet (0.35 mg) by mouth daily    OCP (oral contraceptive pills) initiation

## 2017-12-30 ASSESSMENT — ANXIETY QUESTIONNAIRES: GAD7 TOTAL SCORE: 1

## 2018-01-03 LAB
COPATH REPORT: NORMAL
PAP: NORMAL

## 2018-01-04 LAB
FINAL DIAGNOSIS: NORMAL
HPV HR 12 DNA CVX QL NAA+PROBE: NEGATIVE
HPV16 DNA SPEC QL NAA+PROBE: NEGATIVE
HPV18 DNA SPEC QL NAA+PROBE: NEGATIVE
SPECIMEN DESCRIPTION: NORMAL

## 2018-12-19 ENCOUNTER — TRANSFERRED RECORDS (OUTPATIENT)
Dept: HEALTH INFORMATION MANAGEMENT | Facility: CLINIC | Age: 48
End: 2018-12-19

## 2019-12-03 ENCOUNTER — OFFICE VISIT (OUTPATIENT)
Dept: OBGYN | Facility: CLINIC | Age: 49
End: 2019-12-03
Payer: COMMERCIAL

## 2019-12-03 VITALS
SYSTOLIC BLOOD PRESSURE: 130 MMHG | BODY MASS INDEX: 48.82 KG/M2 | HEIGHT: 65 IN | DIASTOLIC BLOOD PRESSURE: 90 MMHG | WEIGHT: 293 LBS

## 2019-12-03 DIAGNOSIS — Z12.4 SCREENING FOR CERVICAL CANCER: Primary | ICD-10-CM

## 2019-12-03 DIAGNOSIS — E66.01 MORBID OBESITY (H): ICD-10-CM

## 2019-12-03 PROCEDURE — G0145 SCR C/V CYTO,THINLAYER,RESCR: HCPCS | Performed by: OBSTETRICS & GYNECOLOGY

## 2019-12-03 PROCEDURE — 87624 HPV HI-RISK TYP POOLED RSLT: CPT | Performed by: OBSTETRICS & GYNECOLOGY

## 2019-12-03 PROCEDURE — 99396 PREV VISIT EST AGE 40-64: CPT | Performed by: OBSTETRICS & GYNECOLOGY

## 2019-12-03 ASSESSMENT — MIFFLIN-ST. JEOR: SCORE: 2013.1

## 2019-12-03 NOTE — PROGRESS NOTES
SUBJECTIVE:                                                   hSital Krueger is a 49 year old female who presents to clinic today for the following health issue(s):  Patient presents with:  Gyn Exam: Pap      HPI: The patient is seen at this time for her GYN yearly.  She is still menstruating but having some irregularity but no hot flashes.  She continues to struggle with her weight.  She is overdue for a Pap smear.      Patient's last menstrual period was 11/13/2019 (approximate)..     Patient is sexually active, No obstetric history on file..  Using none for contraception.    reports that she has never smoked. She has never used smokeless tobacco.    STD testing offered?  Declined    Health maintenance updated:  yes    Today's PHQ-2 Score:   PHQ-2 ( 1999 Pfizer) 12/7/2015   Q1: Little interest or pleasure in doing things 0   Q2: Feeling down, depressed or hopeless 0   PHQ-2 Score 0     Today's PHQ-9 Score:   PHQ-9 SCORE 12/29/2017   PHQ-9 Total Score 2     Today's SHERRY-7 Score:   SHERRY-7 SCORE 12/29/2017   Total Score 1       Problem list and histories reviewed & adjusted, as indicated.  Additional history: as documented.    Patient Active Problem List   Diagnosis   (none) - all problems resolved or deleted     History reviewed. No pertinent surgical history.   Social History     Tobacco Use     Smoking status: Never Smoker     Smokeless tobacco: Never Used   Substance Use Topics     Alcohol use: Yes      Problem (# of Occurrences) Relation (Name,Age of Onset)    Breast Cancer (1) Cousin    Cerebrovascular Disease (1) Maternal Grandmother    Glaucoma (1) Mother    Heart Failure (1) Father    Hyperlipidemia (2) Mother, Father    Other Cancer (1) Cousin    Ovarian Cancer (1) Cousin    Thyroid Disease (1) Mother            Current Outpatient Medications   Medication Sig     loratadine (CLARITIN) 10 MG tablet Take 10 mg by mouth daily     norethindrone (MICRONOR) 0.35 MG per tablet Take 1 tablet (0.35 mg) by mouth  "daily     No current facility-administered medications for this visit.      Allergies   Allergen Reactions     Penicillins        ROS:  12 point review of systems negative other than symptoms noted below or in the HPI.  No urinary frequency or dysuria, bladder or kidney problems      OBJECTIVE:     BP (!) 130/90   Ht 1.645 m (5' 4.75\")   Wt 138.6 kg (305 lb 9.6 oz)   LMP 11/13/2019 (Approximate)   Breastfeeding No   BMI 51.25 kg/m    Body mass index is 51.25 kg/m .    Exam:  Constitutional:  Appearance: Well nourished, well developed alert, in no acute distress  Neck:  Lymph Nodes:  No lymphadenopathy present; Thyroid:  Gland size normal, nontender, no nodules or masses present on palpation  Chest:  Respiratory Effort:  Breathing unlabored. Clear to auscultation bilaterally.   Cardiovascular: Heart: Auscultation:  Regular rate, normal rhythm, no murmurs present  Breasts:  Inspection of Breasts:  Symmetric bilaterally.  No puckering.  No skin changes.  Palpation of Breasts and Axillae:  No masses present on palpation, no breast tenderness Axillary Lymph Nodes:  No lymphadenopathy present  Gastrointestinal:  Abdominal Examination:  Abdomen nontender to palpation, tone normal without rigidity or guarding, no masses present, umbilicus without lesions; Liver/Spleen:  No hepatomegaly present, liver nontender to palpation; Hernias:  No hernias present   Pelvic examination shows normal external genitalia and vagina.  Pap smear is taken from the cervix.  Bimanual examination is unremarkable with a small firm midline uterus.  In-Clinic Test Results:      ASSESSMENT/PLAN:                                                        ICD-10-CM    1. Screening for cervical cancer Z12.4 Pap imaged thin layer screen with HPV - recommended age 30 - 65     HPV High Risk Types DNA Cervical     49-year-old perimenopausal female with morbid obesity.  We will convey her Pap results to her.  She is getting her mammogram across the [place] in " 2 weeks.  We have strongly encouraged her to get into a program to try to drop some of her weight as it is going to severely affect her health from here on out          Watson Humphrey MD  [facility]

## 2019-12-03 NOTE — LETTER
December 11, 2019    Shital Krueger  [address] [address] [facility] [address]    Dear ,  This letter is regarding your recent Pap smear (cervical cancer screening) and Human Papillomavirus (HPV) test.  We are happy to inform you that your Pap smear result is normal. Cervical cancer is closely linked with certain types of HPV. Your results showed no evidence of high-risk HPV.  We recommend you have your next PAP smear and HPV test in 5 years.  You will still need to return to the clinic every year for an annual exam and other preventive tests.  If you have additional questions regarding this result, please call our registered nurse, Eleni at [phone].  Sincerely,    Watson Humphrey MD/jeannie

## 2019-12-06 LAB
COPATH REPORT: NORMAL
PAP: NORMAL

## 2019-12-09 LAB
FINAL DIAGNOSIS: NORMAL
HPV HR 12 DNA CVX QL NAA+PROBE: NEGATIVE
HPV16 DNA SPEC QL NAA+PROBE: NEGATIVE
HPV18 DNA SPEC QL NAA+PROBE: NEGATIVE
SPECIMEN DESCRIPTION: NORMAL
SPECIMEN SOURCE CVX/VAG CYTO: NORMAL

## 2019-12-20 ENCOUNTER — TRANSFERRED RECORDS (OUTPATIENT)
Dept: HEALTH INFORMATION MANAGEMENT | Facility: CLINIC | Age: 49
End: 2019-12-20

## 2021-05-05 NOTE — Clinical Note
Please abstract the following data from this visit with this patient into the appropriate field in Epic:  Mammogram done on this date: 2 weeks ago (approximately), by this group: [facility], results were normal. 
160.02

## 2021-05-15 ENCOUNTER — HEALTH MAINTENANCE LETTER (OUTPATIENT)
Age: 51
End: 2021-05-15

## 2021-09-04 ENCOUNTER — HEALTH MAINTENANCE LETTER (OUTPATIENT)
Age: 51
End: 2021-09-04

## 2021-12-15 ENCOUNTER — TRANSFERRED RECORDS (OUTPATIENT)
Dept: HEALTH INFORMATION MANAGEMENT | Facility: CLINIC | Age: 51
End: 2021-12-15

## 2022-01-04 ENCOUNTER — HOSPITAL ENCOUNTER (EMERGENCY)
Facility: CLINIC | Age: 52
Discharge: HOME | End: 2022-01-04
Attending: EMERGENCY MEDICINE | Admitting: EMERGENCY MEDICINE
Payer: COMMERCIAL

## 2022-01-04 ENCOUNTER — APPOINTMENT (OUTPATIENT)
Dept: ULTRASOUND IMAGING | Facility: CLINIC | Age: 52
End: 2022-01-04
Attending: EMERGENCY MEDICINE
Payer: COMMERCIAL

## 2022-01-04 VITALS
RESPIRATION RATE: 18 BRPM | TEMPERATURE: 97.3 F | DIASTOLIC BLOOD PRESSURE: 62 MMHG | OXYGEN SATURATION: 97 % | SYSTOLIC BLOOD PRESSURE: 136 MMHG | HEART RATE: 65 BPM

## 2022-01-04 DIAGNOSIS — I80.01 THROMBOPHLEBITIS OF SUPERFICIAL VEINS OF RIGHT LOWER EXTREMITY: ICD-10-CM

## 2022-01-04 PROCEDURE — 99284 EMERGENCY DEPT VISIT MOD MDM: CPT | Mod: 25

## 2022-01-04 PROCEDURE — 93971 EXTREMITY STUDY: CPT | Mod: RT

## 2022-01-04 ASSESSMENT — ENCOUNTER SYMPTOMS: COLOR CHANGE: 1

## 2022-01-04 NOTE — ED TRIAGE NOTES
Patient woke up this AM and noticed redness and swelling in her right upper thigh. States she has a history of blood clots in her legs and this is how it presented last time. Went to urgent care yesterday but they were not able to perform ultrasound. Patient is not on blood thinners. ABCs intact.

## 2022-01-04 NOTE — ED PROVIDER NOTES
History   Chief Complaint:  Leg Pain     The history is provided by the patient.      Shital Krueger is a 51 year old female who presents with leg pain. The patient reports waking up yesterday and noticing a superficial vein in her right lateral upper thigh that had changed from blue to red in color. She has some slight swelling and soreness to the area as well. She notes that this feels similar to the blood clot she had last year in her left thigh. She does not take blood thinners.  No fevers    Review of Systems   Cardiovascular: Positive for leg swelling.   Skin: Positive for color change.   All other systems reviewed and are negative.    Allergies:  Penicillins    Medications:  Hydrodiuril  Cozaar  Micronor     Past Medical History:     Morbid obesity  Blood clots    Past Surgical History:    Varicose vein stripping   section     Family History:    Thyroid disease, mother  Hyperlipidemia, mother/father  Heart failure, father    Social History:  Presents to ED alone.     Physical Exam     Patient Vitals for the past 24 hrs:   BP Temp Temp src Pulse Resp SpO2   22 0834 136/62 -- -- 74 -- 98 %   22 0831 -- 97.3  F (36.3  C) Temporal -- 18 --       Physical Exam  VS: Reviewed per above  HENT: Normal speech  EYES: sclera anicteric  CV: Rate as noted  RESP: Effort normal.   MSK: No deformity of the extremities  SKIN: Warm and dry, induration and mild tenderness of the right superior/lateral thigh soft tissue without significant redness or warmth.    Emergency Department Course     Imaging:  US Lower Extremity Venous Duplex Right   Preliminary Result   IMPRESSION:   1. Negative for deep venous thrombosis throughout the right lower   extremity.   2. Superficial thrombophlebitis underlying area of pain and lump in   the lateral thigh.        Report per radiology    Emergency Department Course:  Reviewed:  I reviewed nursing notes, vitals, past medical history, Care Everywhere and  [facility].    Assessments:  0924 I obtained history and examined the patient as noted above.   1056 I rechecked the patient and explained findings.     Disposition:  The patient was discharged to home.     Impression & Plan     Medical Decision Making:  Patient presents to the ER for evaluation of pain and swelling of the right upper outer thigh.  On arrival vital signs are reassuring.  On exam there are no signs of skin or soft tissue infection.  The area in question is possibly a varicose vein.  Ultrasound does show signs of superficial thrombophlebitis.  No signs of DVT.  Discussed management of this with heat compresses, anti-inflammatories.  Return precautions discussed prior to discharge.    Diagnosis:    ICD-10-CM    1. Thrombophlebitis of superficial veins of right lower extremity  I80.01      Discharge Medications:  New Prescriptions    No medications on file     Scribe Disclosure:  Mara COUGHLIN, am serving as a scribe at 9:24 AM on 1/4/2022 to document services personally performed by Nicho Kaminski MD based on my observations and the provider's statements to me.      Nicho Kaminski MD  01/04/22 [id number]

## 2022-02-14 SDOH — HEALTH STABILITY: PHYSICAL HEALTH: ON AVERAGE, HOW MANY DAYS PER WEEK DO YOU ENGAGE IN MODERATE TO STRENUOUS EXERCISE (LIKE A BRISK WALK)?: 0 DAYS

## 2022-02-14 SDOH — ECONOMIC STABILITY: INCOME INSECURITY: HOW HARD IS IT FOR YOU TO PAY FOR THE VERY BASICS LIKE FOOD, HOUSING, MEDICAL CARE, AND HEATING?: NOT VERY HARD

## 2022-02-14 SDOH — ECONOMIC STABILITY: FOOD INSECURITY: WITHIN THE PAST 12 MONTHS, THE FOOD YOU BOUGHT JUST DIDN'T LAST AND YOU DIDN'T HAVE MONEY TO GET MORE.: NEVER TRUE

## 2022-02-14 SDOH — ECONOMIC STABILITY: TRANSPORTATION INSECURITY
IN THE PAST 12 MONTHS, HAS THE LACK OF TRANSPORTATION KEPT YOU FROM MEDICAL APPOINTMENTS OR FROM GETTING MEDICATIONS?: NO

## 2022-02-14 SDOH — ECONOMIC STABILITY: INCOME INSECURITY: IN THE LAST 12 MONTHS, WAS THERE A TIME WHEN YOU WERE NOT ABLE TO PAY THE MORTGAGE OR RENT ON TIME?: NO

## 2022-02-14 SDOH — ECONOMIC STABILITY: FOOD INSECURITY: WITHIN THE PAST 12 MONTHS, YOU WORRIED THAT YOUR FOOD WOULD RUN OUT BEFORE YOU GOT MONEY TO BUY MORE.: NEVER TRUE

## 2022-02-14 SDOH — HEALTH STABILITY: PHYSICAL HEALTH: ON AVERAGE, HOW MANY MINUTES DO YOU ENGAGE IN EXERCISE AT THIS LEVEL?: 0 MIN

## 2022-02-14 SDOH — ECONOMIC STABILITY: TRANSPORTATION INSECURITY
IN THE PAST 12 MONTHS, HAS LACK OF TRANSPORTATION KEPT YOU FROM MEETINGS, WORK, OR FROM GETTING THINGS NEEDED FOR DAILY LIVING?: NO

## 2022-02-14 ASSESSMENT — ENCOUNTER SYMPTOMS
ABDOMINAL PAIN: 0
SORE THROAT: 0
HEMATURIA: 0
FREQUENCY: 0
JOINT SWELLING: 0
COUGH: 0
DYSURIA: 0
CHILLS: 0
BREAST MASS: 0
NERVOUS/ANXIOUS: 0
ARTHRALGIAS: 0
EYE PAIN: 0
SHORTNESS OF BREATH: 0
HEARTBURN: 0
CONSTIPATION: 0
NAUSEA: 0
DIARRHEA: 0
HEMATOCHEZIA: 0
PALPITATIONS: 0
MYALGIAS: 0
WEAKNESS: 0
DIZZINESS: 0
PARESTHESIAS: 0
HEADACHES: 0
FEVER: 0

## 2022-02-14 ASSESSMENT — SOCIAL DETERMINANTS OF HEALTH (SDOH)
HOW OFTEN DO YOU GET TOGETHER WITH FRIENDS OR RELATIVES?: ONCE A WEEK
HOW OFTEN DO YOU ATTEND CHURCH OR RELIGIOUS SERVICES?: MORE THAN 4 TIMES PER YEAR
DO YOU BELONG TO ANY CLUBS OR ORGANIZATIONS SUCH AS CHURCH GROUPS UNIONS, FRATERNAL OR ATHLETIC GROUPS, OR SCHOOL GROUPS?: YES
IN A TYPICAL WEEK, HOW MANY TIMES DO YOU TALK ON THE PHONE WITH FAMILY, FRIENDS, OR NEIGHBORS?: MORE THAN THREE TIMES A WEEK

## 2022-02-15 ENCOUNTER — OFFICE VISIT (OUTPATIENT)
Dept: FAMILY MEDICINE | Facility: CLINIC | Age: 52
End: 2022-02-15
Payer: COMMERCIAL

## 2022-02-15 VITALS
DIASTOLIC BLOOD PRESSURE: 78 MMHG | HEART RATE: 72 BPM | SYSTOLIC BLOOD PRESSURE: 112 MMHG | TEMPERATURE: 97.7 F | BODY MASS INDEX: 48.82 KG/M2 | HEIGHT: 65 IN | WEIGHT: 293 LBS | OXYGEN SATURATION: 98 % | RESPIRATION RATE: 19 BRPM

## 2022-02-15 DIAGNOSIS — B37.2 YEAST DERMATITIS: ICD-10-CM

## 2022-02-15 DIAGNOSIS — Z00.00 ROUTINE GENERAL MEDICAL EXAMINATION AT A HEALTH CARE FACILITY: ICD-10-CM

## 2022-02-15 DIAGNOSIS — E66.01 MORBID OBESITY (H): Primary | ICD-10-CM

## 2022-02-15 DIAGNOSIS — Z13.220 SCREENING FOR HYPERLIPIDEMIA: ICD-10-CM

## 2022-02-15 DIAGNOSIS — Z11.59 NEED FOR HEPATITIS C SCREENING TEST: ICD-10-CM

## 2022-02-15 DIAGNOSIS — Z11.4 SCREENING FOR HIV (HUMAN IMMUNODEFICIENCY VIRUS): ICD-10-CM

## 2022-02-15 DIAGNOSIS — Z12.11 SCREEN FOR COLON CANCER: ICD-10-CM

## 2022-02-15 DIAGNOSIS — R53.83 OTHER FATIGUE: ICD-10-CM

## 2022-02-15 DIAGNOSIS — R06.83 SNORING: ICD-10-CM

## 2022-02-15 DIAGNOSIS — I10 PRIMARY HYPERTENSION: ICD-10-CM

## 2022-02-15 PROBLEM — L71.9 ROSACEA: Status: ACTIVE | Noted: 2021-02-12

## 2022-02-15 LAB
ERYTHROCYTE [DISTWIDTH] IN BLOOD BY AUTOMATED COUNT: 12.6 % (ref 10–15)
HCT VFR BLD AUTO: 42.2 % (ref 35–47)
HCV AB SERPL QL IA: NONREACTIVE
HGB BLD-MCNC: 13.9 G/DL (ref 11.7–15.7)
HIV 1+2 AB+HIV1 P24 AG SERPL QL IA: NONREACTIVE
MCH RBC QN AUTO: 33.3 PG (ref 26.5–33)
MCHC RBC AUTO-ENTMCNC: 32.9 G/DL (ref 31.5–36.5)
MCV RBC AUTO: 101 FL (ref 78–100)
PLATELET # BLD AUTO: 262 10E3/UL (ref 150–450)
RBC # BLD AUTO: 4.18 10E6/UL (ref 3.8–5.2)
WBC # BLD AUTO: 8 10E3/UL (ref 4–11)

## 2022-02-15 PROCEDURE — 86803 HEPATITIS C AB TEST: CPT | Performed by: NURSE PRACTITIONER

## 2022-02-15 PROCEDURE — 36415 COLL VENOUS BLD VENIPUNCTURE: CPT | Performed by: NURSE PRACTITIONER

## 2022-02-15 PROCEDURE — 90472 IMMUNIZATION ADMIN EACH ADD: CPT | Performed by: NURSE PRACTITIONER

## 2022-02-15 PROCEDURE — 90682 RIV4 VACC RECOMBINANT DNA IM: CPT | Performed by: NURSE PRACTITIONER

## 2022-02-15 PROCEDURE — 85027 COMPLETE CBC AUTOMATED: CPT | Performed by: NURSE PRACTITIONER

## 2022-02-15 PROCEDURE — 90715 TDAP VACCINE 7 YRS/> IM: CPT | Performed by: NURSE PRACTITIONER

## 2022-02-15 PROCEDURE — 84443 ASSAY THYROID STIM HORMONE: CPT | Performed by: NURSE PRACTITIONER

## 2022-02-15 PROCEDURE — 90750 HZV VACC RECOMBINANT IM: CPT | Performed by: NURSE PRACTITIONER

## 2022-02-15 PROCEDURE — 99214 OFFICE O/P EST MOD 30 MIN: CPT | Mod: 25 | Performed by: NURSE PRACTITIONER

## 2022-02-15 PROCEDURE — 87389 HIV-1 AG W/HIV-1&-2 AB AG IA: CPT | Performed by: NURSE PRACTITIONER

## 2022-02-15 PROCEDURE — 99386 PREV VISIT NEW AGE 40-64: CPT | Mod: 25 | Performed by: NURSE PRACTITIONER

## 2022-02-15 PROCEDURE — 90471 IMMUNIZATION ADMIN: CPT | Performed by: NURSE PRACTITIONER

## 2022-02-15 PROCEDURE — 80061 LIPID PANEL: CPT | Performed by: NURSE PRACTITIONER

## 2022-02-15 PROCEDURE — 80048 BASIC METABOLIC PNL TOTAL CA: CPT | Performed by: NURSE PRACTITIONER

## 2022-02-15 PROCEDURE — 83036 HEMOGLOBIN GLYCOSYLATED A1C: CPT | Performed by: NURSE PRACTITIONER

## 2022-02-15 RX ORDER — NYSTATIN 100000 [USP'U]/G
POWDER TOPICAL 2 TIMES DAILY PRN
Qty: 60 G | Refills: 0 | Status: SHIPPED | OUTPATIENT
Start: 2022-02-15

## 2022-02-15 RX ORDER — HYDROCHLOROTHIAZIDE 25 MG/1
TABLET ORAL
COMMUNITY
Start: 2021-03-18 | End: 2023-02-24

## 2022-02-15 RX ORDER — CLOBETASOL PROPIONATE 0.5 MG/G
CREAM TOPICAL
COMMUNITY
Start: 2021-05-06 | End: 2023-02-24

## 2022-02-15 RX ORDER — LOSARTAN POTASSIUM 50 MG/1
TABLET ORAL
COMMUNITY
Start: 2021-04-21 | End: 2023-01-05

## 2022-02-15 RX ORDER — CLOBETASOL PROPIONATE 0.5 MG/G
CREAM TOPICAL
COMMUNITY

## 2022-02-15 ASSESSMENT — ENCOUNTER SYMPTOMS
DIARRHEA: 0
COUGH: 0
SORE THROAT: 0
HEADACHES: 0
MYALGIAS: 0
CONSTIPATION: 0
FREQUENCY: 0
HEARTBURN: 0
CHILLS: 0
EYE PAIN: 0
DIZZINESS: 0
SHORTNESS OF BREATH: 0
HEMATURIA: 0
ARTHRALGIAS: 0
ABDOMINAL PAIN: 0
DYSURIA: 0
BREAST MASS: 0
PARESTHESIAS: 0
JOINT SWELLING: 0
PALPITATIONS: 0
NAUSEA: 0
WEAKNESS: 0
NERVOUS/ANXIOUS: 0
HEMATOCHEZIA: 0
FEVER: 0

## 2022-02-15 ASSESSMENT — PATIENT HEALTH QUESTIONNAIRE - PHQ9
SUM OF ALL RESPONSES TO PHQ QUESTIONS 1-9: 2
SUM OF ALL RESPONSES TO PHQ QUESTIONS 1-9: 2
10. IF YOU CHECKED OFF ANY PROBLEMS, HOW DIFFICULT HAVE THESE PROBLEMS MADE IT FOR YOU TO DO YOUR WORK, TAKE CARE OF THINGS AT HOME, OR GET ALONG WITH OTHER PEOPLE: NOT DIFFICULT AT ALL

## 2022-02-15 ASSESSMENT — ANXIETY QUESTIONNAIRES
2. NOT BEING ABLE TO STOP OR CONTROL WORRYING: NOT AT ALL
GAD7 TOTAL SCORE: 1
7. FEELING AFRAID AS IF SOMETHING AWFUL MIGHT HAPPEN: NOT AT ALL
3. WORRYING TOO MUCH ABOUT DIFFERENT THINGS: NOT AT ALL
6. BECOMING EASILY ANNOYED OR IRRITABLE: SEVERAL DAYS
1. FEELING NERVOUS, ANXIOUS, OR ON EDGE: NOT AT ALL
7. FEELING AFRAID AS IF SOMETHING AWFUL MIGHT HAPPEN: NOT AT ALL
4. TROUBLE RELAXING: NOT AT ALL
5. BEING SO RESTLESS THAT IT IS HARD TO SIT STILL: NOT AT ALL
GAD7 TOTAL SCORE: 1
GAD7 TOTAL SCORE: 1

## 2022-02-15 ASSESSMENT — MIFFLIN-ST. JEOR: SCORE: 2036.33

## 2022-02-15 NOTE — PROGRESS NOTES
SUBJECTIVE:   CC: Shital Krueger is an 51 year old woman who presents for preventive health visit.        Patient has been advised of split billing requirements and indicates understanding: Yes  Healthy Habits:     Getting at least 3 servings of Calcium per day:  NO    Bi-annual eye exam:  NO    Dental care twice a year:  Yes    Sleep apnea or symptoms of sleep apnea:  Daytime drowsiness    Diet:  Other    Duration of exercise:  Less than 15 minutes    Taking medications regularly:  Yes    Medication side effects:  Not applicable    PHQ-2 Total Score: 0    Additional concerns today:  No      Strong family history of MANNY. With increasing fatigue and down mood over since COVID-19.   Mood is somewhat variable with irritable and down.   Weight gain with COVID-19  Denies SI.     Periods regular, every 28 days. Feels possible mood changes. No contraception.     Rash to crease to bilateral breast in summer.     Today's PHQ-2 Score:   PHQ-2 ( 1999 Pfizer) 2/14/2022   Q1: Little interest or pleasure in doing things 0   Q2: Feeling down, depressed or hopeless 0   PHQ-2 Score 0   Q1: Little interest or pleasure in doing things Not at all   Q2: Feeling down, depressed or hopeless Not at all   PHQ-2 Score 0       Abuse: Current or Past (Physical, Sexual or Emotional) - No  Do you feel safe in your environment? Yes    Have you ever done Advance Care Planning? (For example, a Health Directive, POLST, or a discussion with a medical provider or your loved ones about your wishes): No, advance care planning information given to patient to review.  Patient declined advance care planning discussion at this time.    Social History     Tobacco Use     Smoking status: Never Smoker     Smokeless tobacco: Never Used   Substance Use Topics     Alcohol use: Yes         Alcohol Use 2/14/2022   Prescreen: >3 drinks/day or >7 drinks/week? No       Reviewed orders with patient.  Reviewed health maintenance and updated orders accordingly -  Yes  Lab work is in process  Labs reviewed in EPIC    Breast Cancer Screening:    FHS-7:   Breast CA Risk Assessment (FHS-7) 2/14/2022   Did any of your first-degree relatives have breast or ovarian cancer? No   Did any of your relatives have bilateral breast cancer? Unknown   Did any man in your family have breast cancer? No   Did any woman in your family have breast and ovarian cancer? Yes   Did any woman in your family have breast cancer before age 50 y? No   Do you have 2 or more relatives with breast and/or ovarian cancer? Yes   Do you have 2 or more relatives with breast and/or bowel cancer? Yes       Mammogram Screening: Recommended annual mammography  Pertinent mammograms are reviewed under the imaging tab.    History of abnormal Pap smear: NO - age 30-65 PAP every 5 years with negative HPV co-testing recommended  PAP / HPV Latest Ref Rng & Units 12/3/2019 12/29/2017 11/2/2015   PAP (Historical) - NIL NIL NIL   HPV16 NEG:Negative Negative Negative -   HPV18 NEG:Negative Negative Negative -   HRHPV NEG:Negative Negative Negative -     Reviewed and updated as needed this visit by clinical staff  Tobacco  Allergies  Meds  Problems  Med Hx  Surg Hx  Fam Hx  Soc Hx         Reviewed and updated as needed this visit by Provider  Tobacco  Allergies  Meds  Problems  Med Hx  Surg Hx  Fam Hx        History reviewed. No pertinent past medical history.   History reviewed. No pertinent surgical history.    Review of Systems   Constitutional: Negative for chills and fever.   HENT: Negative for congestion, ear pain, hearing loss and sore throat.    Eyes: Negative for pain and visual disturbance.   Respiratory: Negative for cough and shortness of breath.    Cardiovascular: Negative for chest pain, palpitations and peripheral edema.   Gastrointestinal: Negative for abdominal pain, constipation, diarrhea, heartburn, hematochezia and nausea.   Breasts:  Positive for tenderness. Negative for breast mass and  "discharge.   Genitourinary: Negative for dysuria, frequency, genital sores, hematuria, pelvic pain, urgency, vaginal bleeding and vaginal discharge.   Musculoskeletal: Negative for arthralgias, joint swelling and myalgias.   Skin: Negative for rash.   Neurological: Negative for dizziness, weakness, headaches and paresthesias.   Psychiatric/Behavioral: Negative for mood changes. The patient is not nervous/anxious.           OBJECTIVE:   /78 (BP Location: Right arm, Patient Position: Sitting, Cuff Size: Adult Large)   Pulse 72   Temp 97.7  F (36.5  C) (Oral)   Resp 19   Ht 1.638 m (5' 4.5\")   Wt 142.3 kg (313 lb 12.8 oz)   SpO2 98%   BMI 53.03 kg/m    Physical Exam  GENERAL: healthy, alert and no distress  EYES: Eyes grossly normal to inspection, PERRL and conjunctivae and sclerae normal  HENT: ear canals and TM's normal, nose and mouth without ulcers or lesions  NECK: no adenopathy, no asymmetry, masses, or scars and thyroid normal to palpation  RESP: lungs clear to auscultation - no rales, rhonchi or wheezes  BREAST: normal without masses, tenderness or nipple discharge and no palpable axillary masses or adenopathy  CV: regular rate and rhythm, normal S1 S2, no S3 or S4, no murmur, click or rub, no peripheral edema and peripheral pulses strong  ABDOMEN: soft, nontender, no hepatosplenomegaly, no masses and bowel sounds normal  MS: no gross musculoskeletal defects noted, no edema  SKIN: no suspicious lesions or rashes  NEURO: Normal strength and tone, mentation intact and speech normal  PSYCH: mentation appears normal, affect normal/bright    Diagnostic Test Results:  Labs reviewed in Epic    ASSESSMENT/PLAN:   Shital was seen today for physical.    Diagnoses and all orders for this visit:    Morbid obesity (H)  -     Comprehensive Weight Management; Future  -     Basic metabolic panel  (Ca, Cl, CO2, Creat, Gluc, K, Na, BUN); Future  -     CBC with platelets; Future  -     SLEEP EVALUATION & MANAGEMENT " REFERRAL - ADULT -; Future  -     Hemoglobin A1c; Future  -     Basic metabolic panel  (Ca, Cl, CO2, Creat, Gluc, K, Na, BUN)  -     CBC with platelets  -     Hemoglobin A1c  Diet and activity; accepted referral to [facility] Clinic. Unsure of bariatric surgery.     Routine general medical examination at a health care facility  Appropriate vaccines update.     Other fatigue  -     TSH with free T4 reflex; Future  -     TSH with free T4 reflex  To consider weight, mood changes with pandemic, and changes in activity to contribute. Diet and activity as above. Sleep consult pending.     Primary hypertension  Controlled on current therapy    Snoring  -     SLEEP EVALUATION & MANAGEMENT REFERRAL - ADULT -; Future  Consult sleep; weight contributing?    Yeast dermatitis  -     nystatin (MYCOSTATIN) 782019 UNIT/GM external powder; Apply topically 2 times daily as needed for other (for rash to breast) Apply to crease of breast two times per day x 7-10 as needed for rash  As needed nystatin use mostly in warmer months. Discussed medication use, risks, benefits, and side effects.      Screen for colon cancer  -     Adult Gastro Ref - Procedure Only; Future    Screening for HIV (human immunodeficiency virus)  -     HIV Antigen Antibody Combo; Future  -     HIV Antigen Antibody Combo    Need for hepatitis C screening test  -     Hepatitis C Screen Reflex to HCV RNA Quant and Genotype; Future  -     Hepatitis C Screen Reflex to HCV RNA Quant and Genotype    Screening for hyperlipidemia  -     Lipid panel reflex to direct LDL Fasting; Future  -     Lipid panel reflex to direct LDL Fasting    Other orders  -     REVIEW OF HEALTH MAINTENANCE PROTOCOL ORDERS  -     TDAP VACCINE (Adacel, Boostrix)  [[id number]]  -     ZOSTER VACCINE RECOMBINANT (Shingrix)  -     INFLUENZA QUAD, RECOMBINANT, P-FREE (RIV4) (FLUBLOK)        Patient has been advised of split billing requirements and indicates understanding: Yes    COUNSELING:  Reviewed  "preventive health counseling, as reflected in patient instructions       Regular exercise       Healthy diet/nutrition    Estimated body mass index is 53.03 kg/m  as calculated from the following:    Height as of this encounter: 1.638 m (5' 4.5\").    Weight as of this encounter: 142.3 kg (313 lb 12.8 oz).    Weight management plan: Patient referred to endocrine and/or weight management specialty    She reports that she has never smoked. She has never used smokeless tobacco.      Counseling Resources:  ATP IV Guidelines  Pooled Cohorts Equation Calculator  Breast Cancer Risk Calculator  BRCA-Related Cancer Risk Assessment: FHS-7 Tool  FRAX Risk Assessment  ICSI Preventive Guidelines  Dietary Guidelines for Americans, 2010  [employer]'s MyPlate  ASA Prophylaxis  Lung CA Screening    JARAD [facility]  Answers for HPI/ROS submitted by the patient on 2/15/2022  If you checked off any problems, how difficult have these problems made it for you to do your work, take care of things at home, or get along with other people?: Not difficult at all  PHQ9 TOTAL SCORE: 2  SHERRY 7 TOTAL SCORE: 1      "

## 2022-02-16 LAB
ANION GAP SERPL CALCULATED.3IONS-SCNC: 8 MMOL/L (ref 3–14)
BUN SERPL-MCNC: 10 MG/DL (ref 7–30)
CALCIUM SERPL-MCNC: 8.5 MG/DL (ref 8.5–10.1)
CHLORIDE BLD-SCNC: 109 MMOL/L (ref 94–109)
CHOLEST SERPL-MCNC: 147 MG/DL
CO2 SERPL-SCNC: 23 MMOL/L (ref 20–32)
CREAT SERPL-MCNC: 0.72 MG/DL (ref 0.52–1.04)
FASTING STATUS PATIENT QL REPORTED: YES
GFR SERPL CREATININE-BSD FRML MDRD: >90 ML/MIN/1.73M2
GLUCOSE BLD-MCNC: 94 MG/DL (ref 70–99)
HBA1C MFR BLD: 5.5 % (ref 0–5.6)
HDLC SERPL-MCNC: 48 MG/DL
LDLC SERPL CALC-MCNC: 84 MG/DL
NONHDLC SERPL-MCNC: 99 MG/DL
POTASSIUM BLD-SCNC: 4.1 MMOL/L (ref 3.4–5.3)
SODIUM SERPL-SCNC: 140 MMOL/L (ref 133–144)
TRIGL SERPL-MCNC: 77 MG/DL
TSH SERPL DL<=0.005 MIU/L-ACNC: 1.59 MU/L (ref 0.4–4)

## 2022-02-16 ASSESSMENT — PATIENT HEALTH QUESTIONNAIRE - PHQ9: SUM OF ALL RESPONSES TO PHQ QUESTIONS 1-9: 2

## 2022-02-16 ASSESSMENT — ANXIETY QUESTIONNAIRES: GAD7 TOTAL SCORE: 1

## 2022-10-22 ENCOUNTER — HEALTH MAINTENANCE LETTER (OUTPATIENT)
Age: 52
End: 2022-10-22

## 2023-01-05 DIAGNOSIS — I10 PRIMARY HYPERTENSION: Primary | ICD-10-CM

## 2023-01-05 RX ORDER — LOSARTAN POTASSIUM 50 MG/1
50 TABLET ORAL DAILY
Qty: 60 TABLET | Refills: 0 | Status: SHIPPED | OUTPATIENT
Start: 2023-01-05 | End: 2023-01-27

## 2023-01-05 NOTE — TELEPHONE ENCOUNTER
S: Pt calling to request a refill of Losartan 50 mg tabs.     B: Pt states that she switched PCP last year however the pharmacy continue to send refills to previous provider and they continued to refill them so [facility] PCP has not filled this in the past, on med list as reported.   Last PCP OV: 2/15/22    A: Pt has upcoming PE sched 2/24/23 and is requesting a refill to get her through to appt. She has 3 days left of med.    Confirmed pt taking 50 mg PO daily. Med and preferred pharm t'd up.    R: Unable to fill per RN protocol. Provider to authorize or deny.    Carmel Clark, RN, BSN  [facility] Nurse Advisor 10:11 AM 1/5/2023

## 2023-01-27 DIAGNOSIS — I10 PRIMARY HYPERTENSION: ICD-10-CM

## 2023-01-27 RX ORDER — LOSARTAN POTASSIUM 50 MG/1
50 TABLET ORAL DAILY
Qty: 30 TABLET | Refills: 0 | Status: SHIPPED | OUTPATIENT
Start: 2023-01-27 | End: 2023-02-24

## 2023-01-27 RX ORDER — LOSARTAN POTASSIUM 50 MG/1
TABLET ORAL
Qty: 30 TABLET | Refills: 1 | OUTPATIENT
Start: 2023-01-27

## 2023-01-27 NOTE — TELEPHONE ENCOUNTER
Patient calling and needs refill to get her by til appt.  Routing to refill pool to process.  Radha Rutledge RN

## 2023-01-27 NOTE — TELEPHONE ENCOUNTER
Too soon to refill medication. 60 day supply sent 1/5/23    Soledad MAC RN, BSN, PHN  [facility]  [phone]

## 2023-01-27 NOTE — TELEPHONE ENCOUNTER
Prescription approved per [facility] Refill Protocol.    Ethel Lambert RN   PAL (Patient Advocate Liason)  [facility]

## 2023-01-30 ENCOUNTER — ANCILLARY PROCEDURE (OUTPATIENT)
Dept: MAMMOGRAPHY | Facility: CLINIC | Age: 53
End: 2023-01-30
Attending: FAMILY MEDICINE
Payer: COMMERCIAL

## 2023-01-30 DIAGNOSIS — Z12.31 ENCOUNTER FOR SCREENING MAMMOGRAM FOR MALIGNANT NEOPLASM OF BREAST: ICD-10-CM

## 2023-01-30 PROCEDURE — 77067 SCR MAMMO BI INCL CAD: CPT | Mod: TC | Performed by: RADIOLOGY

## 2023-01-30 PROCEDURE — 77063 BREAST TOMOSYNTHESIS BI: CPT | Mod: TC | Performed by: RADIOLOGY

## 2023-02-24 ENCOUNTER — OFFICE VISIT (OUTPATIENT)
Dept: FAMILY MEDICINE | Facility: CLINIC | Age: 53
End: 2023-02-24
Payer: COMMERCIAL

## 2023-02-24 VITALS
OXYGEN SATURATION: 98 % | DIASTOLIC BLOOD PRESSURE: 83 MMHG | TEMPERATURE: 97.9 F | HEIGHT: 65 IN | WEIGHT: 293 LBS | SYSTOLIC BLOOD PRESSURE: 137 MMHG | RESPIRATION RATE: 18 BRPM | HEART RATE: 75 BPM | BODY MASS INDEX: 48.82 KG/M2

## 2023-02-24 DIAGNOSIS — Z12.11 SCREEN FOR COLON CANCER: ICD-10-CM

## 2023-02-24 DIAGNOSIS — I10 PRIMARY HYPERTENSION: ICD-10-CM

## 2023-02-24 DIAGNOSIS — E66.01 CLASS 3 SEVERE OBESITY DUE TO EXCESS CALORIES WITH SERIOUS COMORBIDITY AND BODY MASS INDEX (BMI) OF 50.0 TO 59.9 IN ADULT (H): ICD-10-CM

## 2023-02-24 DIAGNOSIS — J01.00 ACUTE NON-RECURRENT MAXILLARY SINUSITIS: ICD-10-CM

## 2023-02-24 DIAGNOSIS — L71.9 ROSACEA: ICD-10-CM

## 2023-02-24 DIAGNOSIS — Z00.00 ROUTINE GENERAL MEDICAL EXAMINATION AT A HEALTH CARE FACILITY: Primary | ICD-10-CM

## 2023-02-24 DIAGNOSIS — E66.813 CLASS 3 SEVERE OBESITY DUE TO EXCESS CALORIES WITH SERIOUS COMORBIDITY AND BODY MASS INDEX (BMI) OF 50.0 TO 59.9 IN ADULT (H): ICD-10-CM

## 2023-02-24 LAB
ERYTHROCYTE [DISTWIDTH] IN BLOOD BY AUTOMATED COUNT: 12.4 % (ref 10–15)
HCT VFR BLD AUTO: 41.6 % (ref 35–47)
HGB BLD-MCNC: 14.2 G/DL (ref 11.7–15.7)
MCH RBC QN AUTO: 34.4 PG (ref 26.5–33)
MCHC RBC AUTO-ENTMCNC: 34.1 G/DL (ref 31.5–36.5)
MCV RBC AUTO: 101 FL (ref 78–100)
PLATELET # BLD AUTO: 247 10E3/UL (ref 150–450)
RBC # BLD AUTO: 4.13 10E6/UL (ref 3.8–5.2)
WBC # BLD AUTO: 7.8 10E3/UL (ref 4–11)

## 2023-02-24 PROCEDURE — 99214 OFFICE O/P EST MOD 30 MIN: CPT | Mod: 25 | Performed by: NURSE PRACTITIONER

## 2023-02-24 PROCEDURE — 36415 COLL VENOUS BLD VENIPUNCTURE: CPT | Performed by: NURSE PRACTITIONER

## 2023-02-24 PROCEDURE — 85027 COMPLETE CBC AUTOMATED: CPT | Performed by: NURSE PRACTITIONER

## 2023-02-24 PROCEDURE — 99396 PREV VISIT EST AGE 40-64: CPT | Mod: 25 | Performed by: NURSE PRACTITIONER

## 2023-02-24 PROCEDURE — 90471 IMMUNIZATION ADMIN: CPT | Performed by: NURSE PRACTITIONER

## 2023-02-24 PROCEDURE — 80061 LIPID PANEL: CPT | Performed by: NURSE PRACTITIONER

## 2023-02-24 PROCEDURE — 80053 COMPREHEN METABOLIC PANEL: CPT | Performed by: NURSE PRACTITIONER

## 2023-02-24 PROCEDURE — 90750 HZV VACC RECOMBINANT IM: CPT | Performed by: NURSE PRACTITIONER

## 2023-02-24 RX ORDER — HYDROCHLOROTHIAZIDE 25 MG/1
25 TABLET ORAL DAILY PRN
Qty: 60 TABLET | Refills: 0 | Status: SHIPPED | OUTPATIENT
Start: 2023-02-24 | End: 2023-03-07

## 2023-02-24 RX ORDER — LOSARTAN POTASSIUM 50 MG/1
50 TABLET ORAL DAILY
Qty: 30 TABLET | Refills: 0 | Status: SHIPPED | OUTPATIENT
Start: 2023-02-24 | End: 2023-03-07

## 2023-02-24 RX ORDER — FLUTICASONE PROPIONATE 50 MCG
1 SPRAY, SUSPENSION (ML) NASAL DAILY
Qty: 16 G | Refills: 0 | Status: SHIPPED | OUTPATIENT
Start: 2023-02-24 | End: 2023-05-01

## 2023-02-24 SDOH — ECONOMIC STABILITY: INCOME INSECURITY: IN THE LAST 12 MONTHS, WAS THERE A TIME WHEN YOU WERE NOT ABLE TO PAY THE MORTGAGE OR RENT ON TIME?: YES

## 2023-02-24 SDOH — ECONOMIC STABILITY: FOOD INSECURITY: WITHIN THE PAST 12 MONTHS, THE FOOD YOU BOUGHT JUST DIDN'T LAST AND YOU DIDN'T HAVE MONEY TO GET MORE.: NEVER TRUE

## 2023-02-24 SDOH — ECONOMIC STABILITY: FOOD INSECURITY: WITHIN THE PAST 12 MONTHS, YOU WORRIED THAT YOUR FOOD WOULD RUN OUT BEFORE YOU GOT MONEY TO BUY MORE.: NEVER TRUE

## 2023-02-24 SDOH — ECONOMIC STABILITY: INCOME INSECURITY: HOW HARD IS IT FOR YOU TO PAY FOR THE VERY BASICS LIKE FOOD, HOUSING, MEDICAL CARE, AND HEATING?: SOMEWHAT HARD

## 2023-02-24 SDOH — HEALTH STABILITY: PHYSICAL HEALTH: ON AVERAGE, HOW MANY DAYS PER WEEK DO YOU ENGAGE IN MODERATE TO STRENUOUS EXERCISE (LIKE A BRISK WALK)?: 1 DAY

## 2023-02-24 SDOH — HEALTH STABILITY: PHYSICAL HEALTH: ON AVERAGE, HOW MANY MINUTES DO YOU ENGAGE IN EXERCISE AT THIS LEVEL?: 10 MIN

## 2023-02-24 ASSESSMENT — ENCOUNTER SYMPTOMS
WEAKNESS: 0
CHILLS: 0
HEADACHES: 0
NAUSEA: 0
HEMATURIA: 0
BREAST MASS: 0
COUGH: 0
DYSURIA: 0
ABDOMINAL PAIN: 0
PALPITATIONS: 0
MYALGIAS: 0
EYE PAIN: 0
PARESTHESIAS: 0
ARTHRALGIAS: 0
JOINT SWELLING: 0
DIARRHEA: 0
DIZZINESS: 0
HEARTBURN: 0
FEVER: 0
HEMATOCHEZIA: 0
SORE THROAT: 0
NERVOUS/ANXIOUS: 0
CONSTIPATION: 0
SHORTNESS OF BREATH: 0
FREQUENCY: 0

## 2023-02-24 ASSESSMENT — LIFESTYLE VARIABLES
HOW OFTEN DO YOU HAVE A DRINK CONTAINING ALCOHOL: MONTHLY OR LESS
AUDIT-C TOTAL SCORE: 2
HOW OFTEN DO YOU HAVE SIX OR MORE DRINKS ON ONE OCCASION: LESS THAN MONTHLY
SKIP TO QUESTIONS 9-10: 0
HOW MANY STANDARD DRINKS CONTAINING ALCOHOL DO YOU HAVE ON A TYPICAL DAY: 1 OR 2

## 2023-02-24 ASSESSMENT — SOCIAL DETERMINANTS OF HEALTH (SDOH)
HOW OFTEN DO YOU GET TOGETHER WITH FRIENDS OR RELATIVES?: ONCE A WEEK
IN A TYPICAL WEEK, HOW MANY TIMES DO YOU TALK ON THE PHONE WITH FAMILY, FRIENDS, OR NEIGHBORS?: MORE THAN THREE TIMES A WEEK
HOW OFTEN DO YOU ATTEND CHURCH OR RELIGIOUS SERVICES?: MORE THAN 4 TIMES PER YEAR
DO YOU BELONG TO ANY CLUBS OR ORGANIZATIONS SUCH AS CHURCH GROUPS UNIONS, FRATERNAL OR ATHLETIC GROUPS, OR SCHOOL GROUPS?: YES

## 2023-02-24 NOTE — PROGRESS NOTES
SUBJECTIVE:   CC: Agatha is an 52 year old who presents for preventive health visit.     Patient has been advised of split billing requirements and indicates understanding: Yes  Healthy Habits:     Getting at least 3 servings of Calcium per day:  Yes    Bi-annual eye exam:  NO    Dental care twice a year:  Yes    Sleep apnea or symptoms of sleep apnea:  Daytime drowsiness and Excessive snoring    Diet:  Other    Frequency of exercise:  1 day/week    Duration of exercise:  Less than 15 minutes    Taking medications regularly:  Yes    Medication side effects:  None    PHQ-2 Total Score: 0    Additional concerns today:  No    No new partners. Periods light and random in timing.     Staring Go[employer] for weight loss today.     Has sinus congestion x 4-5 days. Was around mother in hospital who had a cold. COVID negative.     Today's PHQ-2 Score:   PHQ-2 ( 1999 Pfizer) 2/24/2023   Q1: Little interest or pleasure in doing things 0   Q2: Feeling down, depressed or hopeless 0   PHQ-2 Score 0   Q1: Little interest or pleasure in doing things Not at all   Q2: Feeling down, depressed or hopeless Not at all   PHQ-2 Score 0           Social History     Tobacco Use     Smoking status: Never     Smokeless tobacco: Never   Substance Use Topics     Alcohol use: Yes       Alcohol Use 2/24/2023   Prescreen: >3 drinks/day or >7 drinks/week? No       Reviewed orders with patient.  Reviewed health maintenance and updated orders accordingly - Yes  Lab work is in process  Labs reviewed in EPIC    Breast Cancer Screening:    FHS-7:   Breast CA Risk Assessment (FHS-7) 2/14/2022 1/30/2023 2/24/2023   Did any of your first-degree relatives have breast or ovarian cancer? No Yes Yes   Did any of your relatives have bilateral breast cancer? Unknown No Unknown   Did any man in your family have breast cancer? No No No   Did any woman in your family have breast and ovarian cancer? Yes Yes Yes   Did any woman in your family have breast cancer before  age 50 y? No No No   Do you have 2 or more relatives with breast and/or ovarian cancer? Yes Yes Yes   Do you have 2 or more relatives with breast and/or bowel cancer? Yes Yes Yes       Mammogram Screening: Recommended annual mammography  Pertinent mammograms are reviewed under the imaging tab.  History of abnormal Pap smear:   PAP / HPV Latest Ref Rng & Units 12/3/2019 12/29/2017 11/2/2015   PAP (Historical) - NIL NIL NIL   HPV16 NEG:Negative Negative Negative -   HPV18 NEG:Negative Negative Negative -   HRHPV NEG:Negative Negative Negative -     Reviewed and updated as needed this visit by clinical staff                  Reviewed and updated as needed this visit by Provider                 History reviewed. No pertinent past medical history.   History reviewed. No pertinent surgical history.    Review of Systems   Constitutional: Negative for chills and fever.   HENT: Negative for congestion, ear pain, hearing loss and sore throat.    Eyes: Positive for visual disturbance. Negative for pain.   Respiratory: Negative for cough and shortness of breath.    Cardiovascular: Negative for chest pain, palpitations and peripheral edema.   Gastrointestinal: Negative for abdominal pain, constipation, diarrhea, heartburn, hematochezia and nausea.   Breasts:  Positive for tenderness. Negative for breast mass and discharge.   Genitourinary: Negative for dysuria, frequency, genital sores, hematuria, pelvic pain, urgency, vaginal bleeding and vaginal discharge.   Musculoskeletal: Negative for arthralgias, joint swelling and myalgias.   Skin: Negative for rash.   Neurological: Negative for dizziness, weakness, headaches and paresthesias.   Psychiatric/Behavioral: Positive for mood changes. The patient is not nervous/anxious.           OBJECTIVE:   There were no vitals taken for this visit.  Physical Exam  GENERAL: healthy, alert and no distress  EYES: Eyes grossly normal to inspection, PERRL and conjunctivae and sclerae  normal  HENT: ear canals and TM's normal, nose and mouth without ulcers or lesions  NECK: no adenopathy, no asymmetry, masses, or scars and thyroid normal to palpation  RESP: lungs clear to auscultation - no rales, rhonchi or wheezes  BREAST: normal without masses, tenderness or nipple discharge and no palpable axillary masses or adenopathy  CV: regular rate and rhythm, normal S1 S2, no S3 or S4, no murmur, click or rub, no peripheral edema and peripheral pulses strong  ABDOMEN: soft, nontender, no hepatosplenomegaly, no masses and bowel sounds normal  MS: no gross musculoskeletal defects noted, no edema  SKIN: no suspicious lesions or rashes  NEURO: Normal strength and tone, mentation intact and speech normal  PSYCH: mentation appears normal, affect normal/bright    Diagnostic Test Results:  Labs reviewed in Epic    ASSESSMENT/PLAN:   Shital was seen today for physical.    Diagnoses and all orders for this visit:    Routine general medical examination at a health care facility  HM updated.     Primary hypertension  -     losartan (COZAAR) 50 MG tablet; Take 1 tablet (50 mg) by mouth daily  -     hydrochlorothiazide (HYDRODIURIL) 25 MG tablet; Take 1 tablet (25 mg) by mouth daily as needed (edema)  Controlled. Working for weight loss.     Class 3 severe obesity due to excess calories with serious comorbidity and body mass index (BMI) of 50.0 to 59.9 in adult (H)  -     Lipid panel reflex to direct LDL Fasting; Future  -     Comprehensive metabolic panel (BMP + Alb, Alk Phos, ALT, AST, Total. Bili, TP); Future  -     CBC with platelets; Future  -     Lipid panel reflex to direct LDL Fasting  -     Comprehensive metabolic panel (BMP + Alb, Alk Phos, ALT, AST, Total. Bili, TP)  -     CBC with platelets  Staring [employer] program today.     Rosacea  -     metroNIDAZOLE (METROCREAM) 0.75 % external cream; Apply topically 2 times daily Apply to face for rosacea  Refilled. Exacerbations with masks.     Acute non-recurrent  maxillary sinusitis  -     fluticasone (FLONASE) 50 MCG/ACT nasal spray; Spray 1 spray into both nostrils daily  Viral syndrome. Discussed over the counter supportive cares.     Screen for colon cancer  -     Colonoscopy Screening  Referral; Future    Other orders  -     REVIEW OF HEALTH MAINTENANCE PROTOCOL ORDERS  -     ZOSTER VACCINE RECOMBINANT ADJUVANTED (SHINGRIX)        Patient has been advised of split billing requirements and indicates understanding: Yes      COUNSELING:  Reviewed preventive health counseling, as reflected in patient instructions        She reports that she has never smoked. She has never used smokeless tobacco.          JARAD Santana CNP  [facility]

## 2023-02-25 LAB
ALBUMIN SERPL BCG-MCNC: 4.2 G/DL (ref 3.5–5.2)
ALP SERPL-CCNC: 88 U/L (ref 35–104)
ALT SERPL W P-5'-P-CCNC: 22 U/L (ref 10–35)
ANION GAP SERPL CALCULATED.3IONS-SCNC: 17 MMOL/L (ref 7–15)
AST SERPL W P-5'-P-CCNC: 25 U/L (ref 10–35)
BILIRUB SERPL-MCNC: 0.4 MG/DL
BUN SERPL-MCNC: 10.8 MG/DL (ref 6–20)
CALCIUM SERPL-MCNC: 9 MG/DL (ref 8.6–10)
CHLORIDE SERPL-SCNC: 105 MMOL/L (ref 98–107)
CHOLEST SERPL-MCNC: 171 MG/DL
CREAT SERPL-MCNC: 0.68 MG/DL (ref 0.51–0.95)
DEPRECATED HCO3 PLAS-SCNC: 20 MMOL/L (ref 22–29)
GFR SERPL CREATININE-BSD FRML MDRD: >90 ML/MIN/1.73M2
GLUCOSE SERPL-MCNC: 92 MG/DL (ref 70–99)
HDLC SERPL-MCNC: 54 MG/DL
LDLC SERPL CALC-MCNC: 103 MG/DL
NONHDLC SERPL-MCNC: 117 MG/DL
POTASSIUM SERPL-SCNC: 4.2 MMOL/L (ref 3.4–5.3)
PROT SERPL-MCNC: 7 G/DL (ref 6.4–8.3)
SODIUM SERPL-SCNC: 142 MMOL/L (ref 136–145)
TRIGL SERPL-MCNC: 69 MG/DL

## 2023-03-05 DIAGNOSIS — I10 PRIMARY HYPERTENSION: ICD-10-CM

## 2023-03-06 DIAGNOSIS — I10 PRIMARY HYPERTENSION: ICD-10-CM

## 2023-03-07 RX ORDER — HYDROCHLOROTHIAZIDE 25 MG/1
25 TABLET ORAL DAILY PRN
Qty: 90 TABLET | Refills: 1 | Status: SHIPPED | OUTPATIENT
Start: 2023-03-07 | End: 2024-01-22

## 2023-03-07 RX ORDER — LOSARTAN POTASSIUM 50 MG/1
TABLET ORAL
Qty: 30 TABLET | Refills: 0 | Status: SHIPPED | OUTPATIENT
Start: 2023-03-07 | End: 2023-04-11

## 2023-03-07 NOTE — TELEPHONE ENCOUNTER
Routing refill request to provider for review/approval because:  Ordered day of appt #60 ok to continue or to give 90 days?     Dilcia SANCHEZ RN

## 2023-04-10 DIAGNOSIS — I10 PRIMARY HYPERTENSION: ICD-10-CM

## 2023-04-11 RX ORDER — LOSARTAN POTASSIUM 50 MG/1
TABLET ORAL
Qty: 30 TABLET | Refills: 0 | Status: SHIPPED | OUTPATIENT
Start: 2023-04-11 | End: 2023-05-09

## 2023-04-11 NOTE — TELEPHONE ENCOUNTER
Prescription approved per [facility] Refill Protocol.  Khushi Rosales, Registered Nurse  [facility]

## 2023-04-27 DIAGNOSIS — J01.00 ACUTE NON-RECURRENT MAXILLARY SINUSITIS: ICD-10-CM

## 2023-05-01 RX ORDER — FLUTICASONE PROPIONATE 50 MCG
SPRAY, SUSPENSION (ML) NASAL
Qty: 16 ML | Refills: 11 | Status: SHIPPED | OUTPATIENT
Start: 2023-05-01

## 2023-05-01 NOTE — TELEPHONE ENCOUNTER
Routing refill request to provider for review/approval because:  Is this an ongoing prescription?     Maia CORBIN RN   [facility]

## 2023-05-08 DIAGNOSIS — I10 PRIMARY HYPERTENSION: ICD-10-CM

## 2023-05-09 RX ORDER — LOSARTAN POTASSIUM 50 MG/1
TABLET ORAL
Qty: 90 TABLET | Refills: 1 | Status: SHIPPED | OUTPATIENT
Start: 2023-05-09 | End: 2023-11-13

## 2023-05-09 NOTE — TELEPHONE ENCOUNTER
Prescription approved per [facility] Refill Protocol.    Khushi Rosales, Registered Nurse  [facility]

## 2023-07-24 DIAGNOSIS — L71.9 ROSACEA: ICD-10-CM

## 2023-11-13 DIAGNOSIS — I10 PRIMARY HYPERTENSION: ICD-10-CM

## 2023-11-13 RX ORDER — LOSARTAN POTASSIUM 50 MG/1
TABLET ORAL
Qty: 90 TABLET | Refills: 0 | Status: SHIPPED | OUTPATIENT
Start: 2023-11-13 | End: 2024-02-12

## 2024-01-02 ENCOUNTER — PATIENT OUTREACH (OUTPATIENT)
Dept: CARE COORDINATION | Facility: CLINIC | Age: 54
End: 2024-01-02
Payer: COMMERCIAL

## 2024-01-22 DIAGNOSIS — I10 PRIMARY HYPERTENSION: ICD-10-CM

## 2024-01-22 RX ORDER — HYDROCHLOROTHIAZIDE 25 MG/1
25 TABLET ORAL DAILY PRN
Qty: 90 TABLET | Refills: 0 | Status: SHIPPED | OUTPATIENT
Start: 2024-01-22 | End: 2024-04-24

## 2024-01-23 NOTE — TELEPHONE ENCOUNTER
Please call patient. Sending 1 refill but due for physical in 1 month. No additional refills until appointment. Please help schedule.    Tucker Mack PA-C on 1/22/2024 at 8:00 PM (covering for Joanie Phillips)

## 2024-01-25 ENCOUNTER — PATIENT OUTREACH (OUTPATIENT)
Dept: CARE COORDINATION | Facility: CLINIC | Age: 54
End: 2024-01-25
Payer: COMMERCIAL

## 2024-02-05 ENCOUNTER — TELEPHONE (OUTPATIENT)
Dept: GASTROENTEROLOGY | Facility: CLINIC | Age: 54
End: 2024-02-05
Payer: COMMERCIAL

## 2024-02-05 DIAGNOSIS — Z12.11 SPECIAL SCREENING FOR MALIGNANT NEOPLASMS, COLON: Primary | ICD-10-CM

## 2024-02-05 NOTE — TELEPHONE ENCOUNTER
"Endoscopy Scheduling Screen    Have you had a positive Covid test in the last 14 days?  No    Are you active on MyChart?   Yes    What insurance is in the chart?  Other:  BP    Ordering/Referring Provider: CAROLINE MARTINS    (If ordering provider performs procedure, schedule with ordering provider unless otherwise instructed. )    BMI: Estimated body mass index is 52.49 kg/m  as calculated from the following:    Height as of 2/24/23: 1.645 m (5' 4.75\").    Weight as of 2/24/23: 142 kg (313 lb).     Sedation Ordered  moderate sedation.   If patient BMI > 50 do not schedule in ASC.    If patient BMI > 45 do not schedule at [facility].    Are you taking methadone or Suboxone?  No    Have you had difficulties, pain, or discomfort during past endoscopy procedures?  No    Are you taking any prescription medications for pain 3 or more times per week?   NO - No RN review required.    Do you have a history of malignant hyperthermia or adverse reaction to anesthesia?  No    (Females) Are you currently pregnant?        Have you been diagnosed or told you have pulmonary hypertension?   No    Do you have an LVAD?  No    Have you been told you have moderate to severe sleep apnea?  No    Have you been told you have COPD, asthma, or any other lung disease?  No    Do you have any heart conditions?  No     Have you ever had an organ transplant?   No    Have you ever had or are you awaiting a heart or lung transplant?   No    Have you had a stroke or transient ischemic attack (TIA aka \"mini stroke\" in the last 6 months?   No    Have you been diagnosed with or been told you have cirrhosis of the liver?   No    Are you currently on dialysis?   No    Do you need assistance transferring?   No    BMI: Estimated body mass index is 52.49 kg/m  as calculated from the following:    Height as of 2/24/23: 1.645 m (5' 4.75\").    Weight as of 2/24/23: 142 kg (313 lb).     Is patients BMI > 40 and scheduling location [facility]?  No    Do you take an injectable " medication for weight loss or diabetes (excluding insulin)?  No    Do you take the medication Naltrexone?  No    Do you take blood thinners?  No       Prep   Are you currently on dialysis or do you have chronic kidney disease?  No    Do you have a diagnosis of diabetes?  No    Do you have a diagnosis of cystic fibrosis (CF)?  No    On a regular basis do you go 3 -5 days between bowel movements?  No    BMI > 40?  Yes (Extended Prep)    Preferred Pharmacy:    [facility]/pharmacy #[id number] - [place] - [address]  [address] [facility] [address]  Phone: [phone] Fax: [phone]      Final Scheduling Details   Colonoscopy prep sent?  Golytely Extended Prep    Procedure scheduled  Colonoscopy    Surgeon:  DIETER     Date of procedure:  4/30     Pre-OP / PAC:   No - Not required for this site.    Location  [facility] - Per order.    Sedation   Moderate Sedation - Per order.      Patient Reminders:   You will receive a call from a Nurse to review instructions and health history.  This assessment must be completed prior to your procedure.  Failure to complete the Nurse assessment may result in the procedure being cancelled.      On the day of your procedure, please designate an adult(s) who can drive you home stay with you for the next 24 hours. The medicines used in the exam will make you sleepy. You will not be able to drive.      You cannot take public transportation, ride share services, or non-medical taxi service without a responsible caregiver.  Medical transport services are allowed with the requirement that a responsible caregiver will receive you at your destination.  We require that drivers and caregivers are confirmed prior to your procedure.

## 2024-02-07 ENCOUNTER — ANCILLARY PROCEDURE (OUTPATIENT)
Dept: MAMMOGRAPHY | Facility: CLINIC | Age: 54
End: 2024-02-07
Payer: COMMERCIAL

## 2024-02-07 DIAGNOSIS — Z12.31 VISIT FOR SCREENING MAMMOGRAM: ICD-10-CM

## 2024-02-07 PROCEDURE — 77063 BREAST TOMOSYNTHESIS BI: CPT | Mod: TC | Performed by: RADIOLOGY

## 2024-02-07 PROCEDURE — 77067 SCR MAMMO BI INCL CAD: CPT | Mod: TC | Performed by: RADIOLOGY

## 2024-02-08 ENCOUNTER — PATIENT OUTREACH (OUTPATIENT)
Dept: CARE COORDINATION | Facility: CLINIC | Age: 54
End: 2024-02-08
Payer: COMMERCIAL

## 2024-02-12 DIAGNOSIS — I10 PRIMARY HYPERTENSION: ICD-10-CM

## 2024-02-12 RX ORDER — LOSARTAN POTASSIUM 50 MG/1
TABLET ORAL
Qty: 90 TABLET | Refills: 0 | Status: SHIPPED | OUTPATIENT
Start: 2024-02-12 | End: 2024-05-17

## 2024-03-13 SDOH — HEALTH STABILITY: PHYSICAL HEALTH: ON AVERAGE, HOW MANY MINUTES DO YOU ENGAGE IN EXERCISE AT THIS LEVEL?: 30 MIN

## 2024-03-13 SDOH — HEALTH STABILITY: PHYSICAL HEALTH: ON AVERAGE, HOW MANY DAYS PER WEEK DO YOU ENGAGE IN MODERATE TO STRENUOUS EXERCISE (LIKE A BRISK WALK)?: 0 DAYS

## 2024-03-13 SDOH — HEALTH STABILITY: PHYSICAL HEALTH: ON AVERAGE, HOW MANY MINUTES DO YOU ENGAGE IN EXERCISE AT THIS LEVEL?: 0 MIN

## 2024-03-13 SDOH — HEALTH STABILITY: PHYSICAL HEALTH: ON AVERAGE, HOW MANY DAYS PER WEEK DO YOU ENGAGE IN MODERATE TO STRENUOUS EXERCISE (LIKE A BRISK WALK)?: 1 DAY

## 2024-03-13 ASSESSMENT — LIFESTYLE VARIABLES
AUDIT-C TOTAL SCORE: 2
HOW OFTEN DO YOU HAVE A DRINK CONTAINING ALCOHOL: MONTHLY OR LESS
HOW MANY STANDARD DRINKS CONTAINING ALCOHOL DO YOU HAVE ON A TYPICAL DAY: 1 OR 2
SKIP TO QUESTIONS 9-10: 0
HOW OFTEN DO YOU HAVE SIX OR MORE DRINKS ON ONE OCCASION: LESS THAN MONTHLY

## 2024-03-13 ASSESSMENT — SOCIAL DETERMINANTS OF HEALTH (SDOH)
DO YOU BELONG TO ANY CLUBS OR ORGANIZATIONS SUCH AS CHURCH GROUPS UNIONS, FRATERNAL OR ATHLETIC GROUPS, OR SCHOOL GROUPS?: YES
IN A TYPICAL WEEK, HOW MANY TIMES DO YOU TALK ON THE PHONE WITH FAMILY, FRIENDS, OR NEIGHBORS?: MORE THAN THREE TIMES A WEEK
HOW OFTEN DO YOU ATTEND CHURCH OR RELIGIOUS SERVICES?: MORE THAN 4 TIMES PER YEAR
HOW OFTEN DO YOU ATTENT MEETINGS OF THE CLUB OR ORGANIZATION YOU BELONG TO?: MORE THAN 4 TIMES PER YEAR
HOW OFTEN DO YOU GET TOGETHER WITH FRIENDS OR RELATIVES?: TWICE A WEEK
HOW OFTEN DO YOU GET TOGETHER WITH FRIENDS OR RELATIVES?: TWICE A WEEK

## 2024-03-13 NOTE — COMMUNITY RESOURCES LIST (ENGLISH)
March 13, 2024           YOUR PERSONALIZED LIST OF SERVICES & PROGRAMS           & RECREATION    Sports      [facility] - Summer Sports Camp  [address] [place] [address] (Distance: 4.4 miles)  Phone: [phone]  Website: [email or web address]_youth_[email or web address]  Language: English  Fee: Self pay      [facility] - Sports clubs and recreational activities - [facility]  [address] [place] [address] (Distance: 4.4 miles)  Language: English  Fee: Self pay, Sliding scale      [employer]FTBALL  Website: http://www.[employer]    Classes/Groups      [facility] - Kickboxing Classes [address]  [address] [place] [address] (Distance: 1.3 miles)  Language: English  Fee: Self pay      [employer] - Kickboxing Classes - [address] [place] [address]  [address] [place] [address] (Distance: 1.3 miles)  Language: English  Fee: Self pay      [facility] - Care Coordination (Healthcare only)  Phone: [phone]  Website: https://[employer]  Language: English, Cymro  Hours: Wed 9:00 AM - 11:30 AM Thu 1:00 PM - 4:00 PM, 5:30 PM - 7:00 PM               IMPORTANT NUMBERS & WEBSITES        Emergency Services  911  .   [facility]  211 http://211unitedway.org  .   Poison Control  (648) 571-8007 http://[place]poison.org http://wisconsinpoison.org  .     Suicide and Crisis Lifeline  988 http://988lifeline.org  .   Childhelp National Child Abuse Hotline  606.179.2676 http://Childhelphotline.org   .   National Sexual Assault Hotline  (954) 277-1370 (HOPE) http://Rainn.org   .     National Runaway Safeline  (340) 915-1082 (RUNAWAY) http://1800runaway.org  .   Pregnancy & Postpartum Support  Call/text 726-678-5656  [place]: http://ppsupportmn.org  [place]: http://Waicai.com/wi  .   Substance Abuse National Helpline ([facility])  924-822-TOFK (9594)  http://Findtreatment.gov   .                DISCLAIMER: [employer] does not endorse any service providers mentioned in this resource list. [employer] does not guarantee that the services mentioned in this resource list will be available to you or will improve your health or wellness.    [facility]

## 2024-03-20 ENCOUNTER — OFFICE VISIT (OUTPATIENT)
Dept: FAMILY MEDICINE | Facility: CLINIC | Age: 54
End: 2024-03-20
Payer: COMMERCIAL

## 2024-03-20 VITALS
WEIGHT: 293 LBS | SYSTOLIC BLOOD PRESSURE: 134 MMHG | RESPIRATION RATE: 16 BRPM | TEMPERATURE: 98.8 F | HEART RATE: 77 BPM | HEIGHT: 65 IN | OXYGEN SATURATION: 97 % | DIASTOLIC BLOOD PRESSURE: 82 MMHG | BODY MASS INDEX: 48.82 KG/M2

## 2024-03-20 DIAGNOSIS — Z00.00 ROUTINE GENERAL MEDICAL EXAMINATION AT A HEALTH CARE FACILITY: Primary | ICD-10-CM

## 2024-03-20 DIAGNOSIS — Z13.1 SCREENING FOR DIABETES MELLITUS: ICD-10-CM

## 2024-03-20 DIAGNOSIS — L71.9 ROSACEA: ICD-10-CM

## 2024-03-20 DIAGNOSIS — I10 PRIMARY HYPERTENSION: ICD-10-CM

## 2024-03-20 DIAGNOSIS — Z23 NEED FOR COVID-19 VACCINE: ICD-10-CM

## 2024-03-20 DIAGNOSIS — Z13.220 SCREENING FOR HYPERLIPIDEMIA: ICD-10-CM

## 2024-03-20 DIAGNOSIS — Z11.3 SCREEN FOR STD (SEXUALLY TRANSMITTED DISEASE): ICD-10-CM

## 2024-03-20 DIAGNOSIS — B96.89 BACTERIAL VAGINOSIS: ICD-10-CM

## 2024-03-20 DIAGNOSIS — E66.01 MORBID OBESITY (H): ICD-10-CM

## 2024-03-20 DIAGNOSIS — N76.0 BACTERIAL VAGINOSIS: ICD-10-CM

## 2024-03-20 DIAGNOSIS — Z12.11 SCREEN FOR COLON CANCER: ICD-10-CM

## 2024-03-20 DIAGNOSIS — Z12.4 SCREENING FOR CERVICAL CANCER: ICD-10-CM

## 2024-03-20 LAB
ANION GAP SERPL CALCULATED.3IONS-SCNC: 10 MMOL/L (ref 7–15)
BUN SERPL-MCNC: 14.4 MG/DL (ref 6–20)
CALCIUM SERPL-MCNC: 9.5 MG/DL (ref 8.6–10)
CHLORIDE SERPL-SCNC: 103 MMOL/L (ref 98–107)
CHOLEST SERPL-MCNC: 178 MG/DL
CLUE CELLS: PRESENT
CREAT SERPL-MCNC: 0.78 MG/DL (ref 0.51–0.95)
DEPRECATED HCO3 PLAS-SCNC: 29 MMOL/L (ref 22–29)
EGFRCR SERPLBLD CKD-EPI 2021: >90 ML/MIN/1.73M2
ERYTHROCYTE [DISTWIDTH] IN BLOOD BY AUTOMATED COUNT: 12 % (ref 10–15)
FASTING STATUS PATIENT QL REPORTED: YES
GLUCOSE SERPL-MCNC: 98 MG/DL (ref 70–99)
HBA1C MFR BLD: 5.6 % (ref 0–5.6)
HCT VFR BLD AUTO: 44.1 % (ref 35–47)
HDLC SERPL-MCNC: 50 MG/DL
HGB BLD-MCNC: 14.6 G/DL (ref 11.7–15.7)
LDLC SERPL CALC-MCNC: 102 MG/DL
MCH RBC QN AUTO: 33.3 PG (ref 26.5–33)
MCHC RBC AUTO-ENTMCNC: 33.1 G/DL (ref 31.5–36.5)
MCV RBC AUTO: 101 FL (ref 78–100)
NONHDLC SERPL-MCNC: 128 MG/DL
PLATELET # BLD AUTO: 247 10E3/UL (ref 150–450)
POTASSIUM SERPL-SCNC: 4.3 MMOL/L (ref 3.4–5.3)
RBC # BLD AUTO: 4.38 10E6/UL (ref 3.8–5.2)
SODIUM SERPL-SCNC: 142 MMOL/L (ref 135–145)
TRICHOMONAS, WET PREP: ABNORMAL
TRIGL SERPL-MCNC: 132 MG/DL
WBC # BLD AUTO: 7.1 10E3/UL (ref 4–11)
WBC'S/HIGH POWER FIELD, WET PREP: ABNORMAL
YEAST, WET PREP: ABNORMAL

## 2024-03-20 PROCEDURE — 91320 SARSCV2 VAC 30MCG TRS-SUC IM: CPT

## 2024-03-20 PROCEDURE — G0145 SCR C/V CYTO,THINLAYER,RESCR: HCPCS

## 2024-03-20 PROCEDURE — 80048 BASIC METABOLIC PNL TOTAL CA: CPT

## 2024-03-20 PROCEDURE — 99214 OFFICE O/P EST MOD 30 MIN: CPT | Mod: 25

## 2024-03-20 PROCEDURE — 83036 HEMOGLOBIN GLYCOSYLATED A1C: CPT

## 2024-03-20 PROCEDURE — 90480 ADMN SARSCOV2 VAC 1/ONLY CMP: CPT

## 2024-03-20 PROCEDURE — 87210 SMEAR WET MOUNT SALINE/INK: CPT

## 2024-03-20 PROCEDURE — 87624 HPV HI-RISK TYP POOLED RSLT: CPT

## 2024-03-20 PROCEDURE — 80061 LIPID PANEL: CPT

## 2024-03-20 PROCEDURE — 85027 COMPLETE CBC AUTOMATED: CPT

## 2024-03-20 PROCEDURE — 99396 PREV VISIT EST AGE 40-64: CPT | Mod: 25

## 2024-03-20 PROCEDURE — 36415 COLL VENOUS BLD VENIPUNCTURE: CPT

## 2024-03-20 RX ORDER — METRONIDAZOLE 500 MG/1
500 TABLET ORAL 2 TIMES DAILY
Qty: 14 TABLET | Refills: 0 | Status: SHIPPED | OUTPATIENT
Start: 2024-03-20 | End: 2024-03-27

## 2024-03-20 NOTE — PATIENT INSTRUCTIONS
Preventive Care Advice   This is general advice given by our system to help you stay healthy. However, your care team may have specific advice just for you. Please talk to your care team about your preventive care needs.  Nutrition  Eat 5 or more servings of fruits and vegetables each day.  Try wheat bread, brown rice and whole grain pasta (instead of white bread, rice, and pasta).  Get enough calcium and vitamin D. Check the label on foods and aim for 100% of the RDA (recommended daily allowance).  Lifestyle  Exercise at least 150 minutes each week   (30 minutes a day, 5 days a week).  Do muscle strengthening activities 2 days a week. These help control your weight and prevent disease.  No smoking.  Wear sunscreen to prevent skin cancer.  Have a dental exam and cleaning every 6 months.  Yearly exams  See your health care team every year to talk about:  Any changes in your health.  Any medicines your care team has prescribed.  Preventive care, family planning, and ways to prevent chronic diseases.  Shots (vaccines)   HPV shots (up to age 26), if you've never had them before.  Hepatitis B shots (up to age 59), if you've never had them before.  COVID-19 shot: Get this shot when it's due.  Flu shot: Get a flu shot every year.  Tetanus shot: Get a tetanus shot every 10 years.  Pneumococcal, hepatitis A, and RSV shots: Ask your care team if you need these based on your risk.  Shingles shot (for age 50 and up).  General health tests  Diabetes screening:  Starting at age 35, Get screened for diabetes at least every 3 years.  If you are younger than age 35, ask your care team if you should be screened for diabetes.  Cholesterol test: At age 39, start having a cholesterol test every 5 years, or more often if advised.  Bone density scan (DEXA): At age 50, ask your care team if you should have this scan for osteoporosis (brittle bones).  Hepatitis C: Get tested at least once in your life.  STIs (sexually transmitted  infections)  Before age 24: Ask your care team if you should be screened for STIs.  After age 24: Get screened for STIs if you're at risk. You are at risk for STIs (including HIV) if:  You are sexually active with more than one person.  You don't use condoms every time.  You or a partner was diagnosed with a sexually transmitted infection.  If you are at risk for HIV, ask about PrEP medicine to prevent HIV.  Get tested for HIV at least once in your life, whether you are at risk for HIV or not.  Cancer screening tests  Cervical cancer screening: If you have a cervix, begin getting regular cervical cancer screening tests at age 21. Most people who have regular screenings with normal results can stop after age 65. Talk about this with your provider.  Breast cancer scan (mammogram): If you've ever had breasts, begin having regular mammograms starting at age 40. This is a scan to check for breast cancer.  Colon cancer screening: It is important to start screening for colon cancer at age 45.  Have a colonoscopy test every 10 years (or more often if you're at risk) Or, ask your provider about stool tests like a FIT test every year or Cologuard test every 3 years.  To learn more about your testing options, visit: https://www.paraBebes.com/478090.pdf.  For help making a decision, visit: https://bit.ly/fd70932.  Prostate cancer screening test: If you have a prostate and are age 55 to 69, ask your provider if you would benefit from a yearly prostate cancer screening test.  Lung cancer screening: If you are a current or former smoker age 50 to 80, ask your care team if ongoing lung cancer screenings are right for you.  For informational purposes only. Not to replace the advice of your health care provider. Copyright   2023 [employer]. All rights reserved. Clinically reviewed by the [facility] Transitions Program. [employer] 029794 - REV 01/24.    Preventing Falls: Care Instructions  Injuries and health  problems such as trouble walking or poor eyesight can increase your risk of falling. So can some medicines. But there are things you can do to help prevent falls. You can exercise to get stronger. You can also arrange your home to make it safer.    Talk to your doctor about the medicines you take. Ask if any of them increase the risk of falls and whether they can be changed or stopped.   Try to exercise regularly. It can help improve your strength and balance. This can help lower your risk of falling.     Practice fall safety and prevention.    Wear low-heeled shoes that fit well and give your feet good support. Talk to your doctor if you have foot problems that make this hard.  Carry a cellphone or wear a medical alert device that you can use to call for help.  Use stepladders instead of chairs to reach high objects. Don't climb if you're at risk for falls. Ask for help, if needed.  Wear the correct eyeglasses, if you need them.    Make your home safer.    Remove rugs, cords, clutter, and furniture from walkways.  Keep your house well lit. Use night-lights in hallways and bathrooms.  Install and use sturdy handrails on stairways.  Wear nonskid footwear, even inside. Don't walk barefoot or in socks without shoes.    Be safe outside.    Use handrails, curb cuts, and ramps whenever possible.  Keep your hands free by using a shoulder bag or backpack.  Try to walk in well-lit areas. Watch out for uneven ground, changes in pavement, and debris.  Be careful in the winter. Walk on the grass or gravel when sidewalks are slippery. Use de-icer on steps and walkways. Add non-slip devices to shoes.    Put grab bars and nonskid mats in your shower or tub and near the toilet. Try to use a shower chair or bath bench when bathing.   Get into a tub or shower by putting in your weaker leg first. Get out with your strong side first. Have a phone or medical alert device in the bathroom with you.   Where can you learn more?  Go to  "https://www.[employer].net/patiented  Enter G117 in the search box to learn more about \"Preventing Falls: Care Instructions.\"  Current as of: July 17, 2023               Content Version: 14.0    7919-7246 [employer].   Care instructions adapted under license by your healthcare professional. If you have questions about a medical condition or this instruction, always ask your healthcare professional. [employer] disclaims any warranty or liability for your use of this information.      Learning About Stress  What is stress?     Stress is your body's response to a hard situation. Your body can have a physical, emotional, or mental response. Stress is a fact of life for most people, and it affects everyone differently. What causes stress for you may not be stressful for someone else.  A lot of things can cause stress. You may feel stress when you go on a job interview, take a test, or run a race. This kind of short-term stress is normal and even useful. It can help you if you need to work hard or react quickly. For example, stress can help you finish an important job on time.  Long-term stress is caused by ongoing stressful situations or events. Examples of long-term stress include long-term health problems, ongoing problems at work, or conflicts in your family. Long-term stress can harm your health.  How does stress affect your health?  When you are stressed, your body responds as though you are in danger. It makes hormones that speed up your heart, make you breathe faster, and give you a burst of energy. This is called the fight-or-flight stress response. If the stress is over quickly, your body goes back to normal and no harm is done.  But if stress happens too often or lasts too long, it can have bad effects. Long-term stress can make you more likely to get sick, and it can make symptoms of some diseases worse. If you tense up when you are stressed, you may develop neck, shoulder, or low " back pain. Stress is linked to high blood pressure and heart disease.  Stress also harms your emotional health. It can make you coleman, tense, or depressed. Your relationships may suffer, and you may not do well at work or school.  What can you do to manage stress?  You can try these things to help manage stress:   Do something active. Exercise or activity can help reduce stress. Walking is a great way to get started. Even everyday activities such as housecleaning or yard work can help.  Try yoga or lynnette chi. These techniques combine exercise and meditation. You may need some training at first to learn them.  Do something you enjoy. For example, listen to music or go to a movie. Practice your hobby or do volunteer work.  Meditate. This can help you relax, because you are not worrying about what happened before or what may happen in the future.  Do guided imagery. Imagine yourself in any setting that helps you feel calm. You can use online videos, books, or a teacher to guide you.  Do breathing exercises. For example:  From a standing position, bend forward from the waist with your knees slightly bent. Let your arms dangle close to the floor.  Breathe in slowly and deeply as you return to a standing position. Roll up slowly and lift your head last.  Hold your breath for just a few seconds in the standing position.  Breathe out slowly and bend forward from the waist.  Let your feelings out. Talk, laugh, cry, and express anger when you need to. Talking with supportive friends or family, a counselor, or a charan leader about your feelings is a healthy way to relieve stress. Avoid discussing your feelings with people who make you feel worse.  Write. It may help to write about things that are bothering you. This helps you find out how much stress you feel and what is causing it. When you know this, you can find better ways to cope.  What can you do to prevent stress?  You might try some of these things to help prevent  "stress:  Manage your time. This helps you find time to do the things you want and need to do.  Get enough sleep. Your body recovers from the stresses of the day while you are sleeping.  Get support. Your family, friends, and community can make a difference in how you experience stress.  Limit your news feed. Avoid or limit time on social media or news that may make you feel stressed.  Do something active. Exercise or activity can help reduce stress. Walking is a great way to get started.  Where can you learn more?  Go to https://www.[employer].net/patiented  Enter N032 in the search box to learn more about \"Learning About Stress.\"  Current as of: October 24, 2023               Content Version: 14.0    1617-3158 [employer].   Care instructions adapted under license by your healthcare professional. If you have questions about a medical condition or this instruction, always ask your healthcare professional. [employer] disclaims any warranty or liability for your use of this information.      "

## 2024-03-20 NOTE — PROGRESS NOTES
Preventive Care Visit  [facility]  Kenia Hanks PA-C, Family Medicine  Mar 20, 2024    Assessment & Plan     (Z00.00) Routine general medical examination at a health care facility  (primary encounter diagnosis)  Stable exam. Routine screening labs, patient is fasting today. Covid booster given in clinic today. Follow up in 1 year for annual wellness; sooner as needed for acute concerns.  Plan: REVIEW OF HEALTH MAINTENANCE PROTOCOL ORDERS,         Lipid panel reflex to direct LDL Fasting, Basic        metabolic panel  (Ca, Cl, CO2, Creat, Gluc, K,         Na, BUN), Hemoglobin A1c, CBC with platelets          (L71.9) Rosacea  Well controlled with use of metronidazole cream PRN. No new side effects of the medication. Refill provided.  Plan: metroNIDAZOLE (METROCREAM) 0.75 % external         cream          (I10) Primary hypertension  Well controlled with use of currently medications. No new side effects of the medication. Declines refills at this time, reporting not due for refills through pharmacy.    (E66.01) Morbid obesity (H)  Interested in weight management referral to discuss options for weight. Referral given today.   Plan: Adult Comprehensive Weight Management         Referral          (Z12.11) Screen for colon cancer  Scheduled for colonoscopy in April.     (Z13.1) Screening for diabetes mellitus  Check BMP and A1c for diabetes screening.  Plan: Basic metabolic panel  (Ca, Cl, CO2, Creat,         Gluc, K, Na, BUN), Hemoglobin A1c          (Z13.220) Screening for hyperlipidemia  Check fasting lipid panel for cholesterol screening.   Plan: Lipid panel reflex to direct LDL Fasting          (Z12.4) Screening for cervical cancer  Pap smear performed today, >3 years since last pap.  Plan: Pap screen with HPV - recommended age 30 - 65         years          (Z11.3) Screen for STD (sexually transmitted disease)  Wet prep swab for STD screening.   Plan: Wet prep - Clinic Collect         "  (Z23) Need for COVID-19 vaccine  Plan: COVID-19 12+ (2023-24) (PFIZER)            Patient has been advised of split billing requirements and indicates understanding: Yes    BMI  Estimated body mass index is 51.15 kg/m  as calculated from the following:    Height as of this encounter: 1.645 m (5' 4.75\").    Weight as of this encounter: 138.3 kg (305 lb).     Counseling  Appropriate preventive services were discussed with this patient, including applicable screening as appropriate for fall prevention, nutrition, physical activity, Tobacco-use cessation, weight loss and cognition.  Checklist reviewing preventive services available has been given to the patient.  Reviewed patient's diet, addressing concerns and/or questions.   She is at risk for psychosocial distress and has been provided with information to reduce risk.       Follow up in 1 year for physical; sooner with any acute concerns.    Peter Snider is a 53 year old, presenting for the following:  Physical        3/20/2024     8:10 AM   Additional Questions   Roomed by Irina        Health Care Directive  Patient does not have a Health Care Directive or Living Will: Discussed advance care planning with patient; however, patient declined at this time.    HPI        3/13/2024   General Health   How would you rate your overall physical health? Good   Feel stress (tense, anxious, or unable to sleep) Only a little    Only a little   (!) STRESS CONCERN      3/13/2024   Nutrition   Three or more servings of calcium each day? (!) I DON'T KNOW   Diet: Regular (no restrictions)   How many servings of fruit and vegetables per day? (!) 0-1   How many sweetened beverages each day? 0-1         3/13/2024   Exercise   Days per week of moderate/strenous exercise 0 days    1 day   Average minutes spent exercising at this level 0 min    30 min   (!) EXERCISE CONCERN      3/13/2024   Social Factors   Frequency of gathering with friends or relatives Twice a week    Twice a " week   Worry food won't last until get money to buy more No    No   Food not last or not have enough money for food? No    No   Do you have housing?  Yes    Yes   Are you worried about losing your housing? No    No   Lack of transportation? No    No   Unable to get utilities (heat,electricity)? No    No         3/20/2024   Fall Risk   Gait Speed Test (Document in seconds) 3   Gait Speed Test Interpretation Less than or equal to 5.00 seconds - PASS          3/13/2024   Dental   Dentist two times every year? Yes         3/13/2024   TB Screening   Were you born outside of the [place]? No         3/13/2024   Substance Use   Frequency of drinking alcohol? Monthly or less   Alcohol more than 3/day or more than 7/wk No   Do you use any other substances recreationally? No     Social History     Tobacco Use    Smoking status: Never    Smokeless tobacco: Never   Substance Use Topics    Alcohol use: Yes    Drug use: No         2/7/2024   LAST FHS-7 RESULTS   1st degree relative breast or ovarian cancer No   Any relative bilateral breast cancer No   Any male have breast cancer No   Any ONE woman have BOTH breast AND ovarian cancer Yes   Any woman with breast cancer before 50yrs No   2 or more relatives with breast AND/OR ovarian cancer Yes   2 or more relatives with breast AND/OR bowel cancer Yes     Mammogram Screening - Mammogram every 1-2 years updated in Health Maintenance based on mutual decision making        3/13/2024   STI Screening   New sexual partner(s) since last STI/HIV test? No     History of abnormal Pap smear: NO - age 30- 65 PAP every 3 years recommended      Latest Ref Rng & Units 12/3/2019     1:27 PM 12/3/2019     1:20 PM 12/29/2017    11:18 AM   PAP / HPV   PAP (Historical)  NIL      HPV 16 DNA NEG^Negative  Negative  Negative    HPV 18 DNA NEG^Negative  Negative  Negative    Other HR HPV NEG^Negative  Negative  Negative      ASCVD Risk   The 10-year ASCVD risk score (Nigel RAO, et al., 2019) is: 1.9%     Values used to calculate the score:      Age: 53 years      Sex: Female      Is Non- : No      Diabetic: No      Tobacco smoker: No      Systolic Blood Pressure: 134 mmHg      Is BP treated: Yes      HDL Cholesterol: 54 mg/dL      Total Cholesterol: 171 mg/dL    Reviewed and updated as needed this visit by Provider   Tobacco  Allergies  Meds  Problems  Med Hx  Surg Hx  Fam Hx            History reviewed. No pertinent past medical history.  History reviewed. No pertinent surgical history.  BP Readings from Last 3 Encounters:   03/20/24 134/82   02/24/23 137/83   02/15/22 112/78    Wt Readings from Last 3 Encounters:   03/20/24 138.3 kg (305 lb)   02/24/23 142 kg (313 lb)   02/15/22 142.3 kg (313 lb 12.8 oz)                  Patient Active Problem List   Diagnosis    Morbid obesity (H)    HTN (hypertension)    Rosacea    Yeast dermatitis     History reviewed. No pertinent surgical history.    Social History     Tobacco Use    Smoking status: Never    Smokeless tobacco: Never   Substance Use Topics    Alcohol use: Yes     Family History   Problem Relation Age of Onset    Glaucoma Mother     Thyroid Disease Mother     Hyperlipidemia Mother     Hypertension Mother     Other Cancer Cousin         ovarian    Breast Cancer Cousin     Ovarian Cancer Cousin     Heart Failure Father     Hyperlipidemia Father     Hypertension Father     Prostate Cancer Father     Cerebrovascular Disease Maternal Grandmother     Hypertension Paternal Half-Brother     Prostate Cancer Paternal Half-Brother     Hypertension Paternal Half-Brother     Other Cancer Paternal Half-Brother         non-hodgkins lymphoma    Hypertension Paternal Half-Sister     Hypertension Paternal Half-Sister     Cerebrovascular Disease Cousin         51    Cerebrovascular Disease Cousin         58    Breast Cancer Cousin     Breast Cancer Cousin     Prostate Cancer Other         uncle    Other Cancer Other         liver cancer-uncle     "Other Cancer Other         multiple myeloma-uncle    Other Cancer Other         lung cancer-uncle         Current Outpatient Medications   Medication Sig Dispense Refill    clobetasol (TEMOVATE) 0.05 % CREA cream       fluticasone (FLONASE) 50 MCG/ACT nasal spray INSTILL 1 SPRAY INTO BOTH NOSTRILS DAILY 16 mL 11    hydrochlorothiazide (HYDRODIURIL) 25 MG tablet Take 1 tablet (25 mg) by mouth daily as needed (edema) NEED APPT FOR FURTHER REFILLS 90 tablet 0    loratadine (CLARITIN) 10 MG tablet Take 10 mg by mouth daily      losartan (COZAAR) 50 MG tablet TAKE 1 TABLET BY MOUTH EVERY DAY 90 tablet 0    metroNIDAZOLE (FLAGYL) 500 MG tablet Take 1 tablet (500 mg) by mouth 2 times daily for 7 days 14 tablet 0    metroNIDAZOLE (METROCREAM) 0.75 % external cream Apply topically 2 times daily Apply to face for rosacea 45 g 1    nystatin (MYCOSTATIN) 895870 UNIT/GM external powder Apply topically 2 times daily as needed for other (for rash to breast) Apply to crease of breast two times per day x 7-10 as needed for rash 60 g 0     Allergies   Allergen Reactions    Penicillins Unknown         Review of Systems  Constitutional, HEENT, cardiovascular, pulmonary, GI, , musculoskeletal, neuro, skin, endocrine and psych systems are negative, except as otherwise noted.       Objective    Exam  /82 (BP Location: Right arm, Patient Position: Chair, Cuff Size: Adult Large)   Pulse 77   Temp 98.8  F (37.1  C) (Oral)   Resp 16   Ht 1.645 m (5' 4.75\")   Wt 138.3 kg (305 lb)   SpO2 97%   BMI 51.15 kg/m     Estimated body mass index is 51.15 kg/m  as calculated from the following:    Height as of this encounter: 1.645 m (5' 4.75\").    Weight as of this encounter: 138.3 kg (305 lb).    Physical Exam  GENERAL: alert and no distress  EYES: Eyes grossly normal to inspection, PERRL and conjunctivae and sclerae normal  HENT: ear canals and TM's normal, nose and mouth without ulcers or lesions  NECK: no adenopathy, no asymmetry, " masses, or scars  RESP: lungs clear to auscultation - no rales, rhonchi or wheezes  BREAST: normal without masses, tenderness or nipple discharge and no palpable axillary masses or adenopathy  CV: regular rate and rhythm, normal S1 S2, no S3 or S4, no murmur, click or rub, no peripheral edema  ABDOMEN: soft, nontender, no masses and bowel sounds normal   (female): normal female external genitalia, normal urethral meatus, normal vaginal mucosa  MS: no gross musculoskeletal defects noted, no edema  SKIN: no suspicious lesions or rashes  NEURO: Normal strength and tone, mentation intact and speech normal  PSYCH: mentation appears normal, affect normal/bright      Signed Electronically by: Kenia Hanks PA-C

## 2024-03-25 LAB
BKR LAB AP GYN ADEQUACY: NORMAL
BKR LAB AP GYN INTERPRETATION: NORMAL
BKR LAB AP HPV REFLEX: NORMAL
BKR LAB AP PREVIOUS ABNORMAL: NORMAL
PATH REPORT.COMMENTS IMP SPEC: NORMAL
PATH REPORT.COMMENTS IMP SPEC: NORMAL
PATH REPORT.RELEVANT HX SPEC: NORMAL

## 2024-03-27 LAB
HUMAN PAPILLOMA VIRUS 16 DNA: NEGATIVE
HUMAN PAPILLOMA VIRUS 18 DNA: NEGATIVE
HUMAN PAPILLOMA VIRUS FINAL DIAGNOSIS: NORMAL
HUMAN PAPILLOMA VIRUS OTHER HR: NEGATIVE

## 2024-04-03 RX ORDER — BISACODYL 5 MG/1
TABLET, DELAYED RELEASE ORAL
Qty: 4 TABLET | Refills: 0 | Status: SHIPPED | OUTPATIENT
Start: 2024-04-03

## 2024-04-03 NOTE — TELEPHONE ENCOUNTER
Extended Golytely Bowel Prep . Instructions were sent via [employer]. Bowel prep was sent 4/3/2024 to [facility]/PHARMACY #[id number] - [place], [place] - [address]

## 2024-04-15 ENCOUNTER — TELEPHONE (OUTPATIENT)
Dept: GASTROENTEROLOGY | Facility: CLINIC | Age: 54
End: 2024-04-15
Payer: COMMERCIAL

## 2024-04-15 DIAGNOSIS — Z12.11 SCREEN FOR COLON CANCER: Primary | ICD-10-CM

## 2024-04-15 NOTE — TELEPHONE ENCOUNTER
Sent message to JARAD Santana CNP to place new order d/t last one is     Pre visit planning completed.      Procedure details:    Patient scheduled for Colonoscopy  on 2024.     Arrival time: 0730. Procedure time 0815    Facility location: [facility]; [address] [place] [address]. Check in location: Main entrance at . Come through the roundabout underneath the awning (not the ER entrance).    Sedation type: Conscious sedation     Pre op exam needed? N/A    Indication for procedure: Screen for colon cancer       Chart review:     Electronic implanted devices? No    Recent diagnosis of diverticulitis within the last 6 weeks? No    Diabetic? No      Medication review:    Anticoagulants? No    NSAIDS? No NSAID medications per patient's medication list.  RN will verify with pre-assessment call.    Other medication HOLDING recommendations:  N/A      Prep for procedure:     Bowel prep recommendation: Extended Golytely. Bowel prep prescription sent to [facility]/PHARMACY #[id number] - [place], [place] - [address]   Due to: BMI > 40.     Prep instructions sent via [facility]         Sarah Garibay RN  Endoscopy Procedure Pre Assessment RN  [phone] option 4

## 2024-04-15 NOTE — TELEPHONE ENCOUNTER
Pre assessment completed for upcoming procedure.   (Please see previous telephone encounter notes for complete details)    Patient  returned call.       Procedure details:    Arrival time and facility location reviewed.    Pre op exam needed? N/A    Designated  policy reviewed. Instructed to have someone stay 6  hours post procedure.       Medication review:    NSAID medication(s): Celecoxib (Celebrex): HOLD 3 days before procedure       Prep for procedure:     Procedure prep instructions reviewed.        Any additional information needed:  N/A      Patient  verbalized understanding and had no questions or concerns at this time.      Sarah Ni RN  Endoscopy Procedure Pre Assessment RN  [phone] option 4

## 2024-04-24 DIAGNOSIS — I10 PRIMARY HYPERTENSION: ICD-10-CM

## 2024-04-24 RX ORDER — HYDROCHLOROTHIAZIDE 25 MG/1
25 TABLET ORAL DAILY PRN
Qty: 90 TABLET | Refills: 2 | Status: SHIPPED | OUTPATIENT
Start: 2024-04-24

## 2024-04-30 ENCOUNTER — HOSPITAL ENCOUNTER (OUTPATIENT)
Facility: CLINIC | Age: 54
Discharge: HOME | End: 2024-04-30
Attending: INTERNAL MEDICINE | Admitting: INTERNAL MEDICINE
Payer: COMMERCIAL

## 2024-04-30 VITALS
HEIGHT: 65 IN | OXYGEN SATURATION: 98 % | BODY MASS INDEX: 48.82 KG/M2 | SYSTOLIC BLOOD PRESSURE: 133 MMHG | DIASTOLIC BLOOD PRESSURE: 72 MMHG | RESPIRATION RATE: 16 BRPM | HEART RATE: 61 BPM | WEIGHT: 293 LBS

## 2024-04-30 LAB — COLONOSCOPY: NORMAL

## 2024-04-30 PROCEDURE — 88305 TISSUE EXAM BY PATHOLOGIST: CPT | Mod: 26

## 2024-04-30 PROCEDURE — 45382 COLONOSCOPY W/CONTROL BLEED: CPT | Performed by: INTERNAL MEDICINE

## 2024-04-30 PROCEDURE — 88305 TISSUE EXAM BY PATHOLOGIST: CPT | Mod: TC | Performed by: INTERNAL MEDICINE

## 2024-04-30 PROCEDURE — G0500 MOD SEDAT ENDO SERVICE >5YRS: HCPCS | Mod: PT | Performed by: INTERNAL MEDICINE

## 2024-04-30 PROCEDURE — 45385 COLONOSCOPY W/LESION REMOVAL: CPT | Mod: PT | Performed by: INTERNAL MEDICINE

## 2024-04-30 PROCEDURE — 250N000011 HC RX IP 250 OP 636: Performed by: INTERNAL MEDICINE

## 2024-04-30 RX ORDER — PROCHLORPERAZINE MALEATE 10 MG
10 TABLET ORAL EVERY 6 HOURS PRN
Status: DISCONTINUED | OUTPATIENT
Start: 2024-04-30 | End: 2024-04-30 | Stop reason: HOSPADM

## 2024-04-30 RX ORDER — DIPHENHYDRAMINE HYDROCHLORIDE 50 MG/ML
25-50 INJECTION INTRAMUSCULAR; INTRAVENOUS
Status: DISCONTINUED | OUTPATIENT
Start: 2024-04-30 | End: 2024-04-30 | Stop reason: HOSPADM

## 2024-04-30 RX ORDER — EPINEPHRINE 1 MG/ML
0.1 INJECTION, SOLUTION INTRAMUSCULAR; SUBCUTANEOUS
Status: DISCONTINUED | OUTPATIENT
Start: 2024-04-30 | End: 2024-04-30 | Stop reason: HOSPADM

## 2024-04-30 RX ORDER — ONDANSETRON 2 MG/ML
4 INJECTION INTRAMUSCULAR; INTRAVENOUS EVERY 6 HOURS PRN
Status: DISCONTINUED | OUTPATIENT
Start: 2024-04-30 | End: 2024-04-30 | Stop reason: HOSPADM

## 2024-04-30 RX ORDER — ATROPINE SULFATE 0.1 MG/ML
1 INJECTION INTRAVENOUS
Status: DISCONTINUED | OUTPATIENT
Start: 2024-04-30 | End: 2024-04-30 | Stop reason: HOSPADM

## 2024-04-30 RX ORDER — ONDANSETRON 4 MG/1
4 TABLET, ORALLY DISINTEGRATING ORAL EVERY 6 HOURS PRN
Status: DISCONTINUED | OUTPATIENT
Start: 2024-04-30 | End: 2024-04-30 | Stop reason: HOSPADM

## 2024-04-30 RX ORDER — NALOXONE HYDROCHLORIDE 0.4 MG/ML
0.4 INJECTION, SOLUTION INTRAMUSCULAR; INTRAVENOUS; SUBCUTANEOUS
Status: DISCONTINUED | OUTPATIENT
Start: 2024-04-30 | End: 2024-04-30 | Stop reason: HOSPADM

## 2024-04-30 RX ORDER — SIMETHICONE 40MG/0.6ML
133 SUSPENSION, DROPS(FINAL DOSAGE FORM)(ML) ORAL
Status: DISCONTINUED | OUTPATIENT
Start: 2024-04-30 | End: 2024-04-30 | Stop reason: HOSPADM

## 2024-04-30 RX ORDER — ONDANSETRON 2 MG/ML
4 INJECTION INTRAMUSCULAR; INTRAVENOUS
Status: DISCONTINUED | OUTPATIENT
Start: 2024-04-30 | End: 2024-04-30 | Stop reason: HOSPADM

## 2024-04-30 RX ORDER — FLUMAZENIL 0.1 MG/ML
0.2 INJECTION, SOLUTION INTRAVENOUS
Status: DISCONTINUED | OUTPATIENT
Start: 2024-04-30 | End: 2024-04-30 | Stop reason: HOSPADM

## 2024-04-30 RX ORDER — NALOXONE HYDROCHLORIDE 0.4 MG/ML
0.2 INJECTION, SOLUTION INTRAMUSCULAR; INTRAVENOUS; SUBCUTANEOUS
Status: DISCONTINUED | OUTPATIENT
Start: 2024-04-30 | End: 2024-04-30 | Stop reason: HOSPADM

## 2024-04-30 RX ORDER — LIDOCAINE 40 MG/G
CREAM TOPICAL
Status: DISCONTINUED | OUTPATIENT
Start: 2024-04-30 | End: 2024-04-30 | Stop reason: HOSPADM

## 2024-04-30 RX ORDER — FENTANYL CITRATE 50 UG/ML
50-100 INJECTION, SOLUTION INTRAMUSCULAR; INTRAVENOUS EVERY 5 MIN PRN
Status: DISCONTINUED | OUTPATIENT
Start: 2024-04-30 | End: 2024-04-30 | Stop reason: HOSPADM

## 2024-04-30 RX ADMIN — MIDAZOLAM 2 MG: 1 INJECTION INTRAMUSCULAR; INTRAVENOUS at 08:28

## 2024-04-30 RX ADMIN — FENTANYL CITRATE 100 MCG: 50 INJECTION, SOLUTION INTRAMUSCULAR; INTRAVENOUS at 08:28

## 2024-04-30 ASSESSMENT — ACTIVITIES OF DAILY LIVING (ADL)
ADLS_ACUITY_SCORE: 35
ADLS_ACUITY_SCORE: 35

## 2024-04-30 NOTE — H&P
Pre-Endoscopy History and Physical     Shital Krueger MRN# [id number]   YOB: 1970 Age: 53 year old     Date of Procedure: 4/30/2024  Primary care provider: Kenia Hanks  Type of Endoscopy: Colonoscopy with possible biopsy, possible polypectomy  Reason for Procedure: screen  Type of Anesthesia Anticipated: Conscious Sedation    HPI:    Shital is a 53 year old female who will be undergoing the above procedure.      A history and physical has been performed. The patient's medications and allergies have been reviewed. The risks and benefits of the procedure and the sedation options and risks were discussed with the patient.  All questions were answered and informed consent was obtained.      She denies a personal or family history of anesthesia complications or bleeding disorders.     Patient Active Problem List   Diagnosis    Morbid obesity (H)    HTN (hypertension)    Rosacea    Yeast dermatitis        No past medical history on file.     No past surgical history on file.    Social History     Tobacco Use    Smoking status: Never    Smokeless tobacco: Never   Substance Use Topics    Alcohol use: Yes       Family History   Problem Relation Age of Onset    Glaucoma Mother     Thyroid Disease Mother     Hyperlipidemia Mother     Hypertension Mother     Other Cancer Cousin         ovarian    Breast Cancer Cousin     Ovarian Cancer Cousin     Heart Failure Father     Hyperlipidemia Father     Hypertension Father     Prostate Cancer Father     Cerebrovascular Disease Maternal Grandmother     Hypertension Paternal Half-Brother     Prostate Cancer Paternal Half-Brother     Hypertension Paternal Half-Brother     Other Cancer Paternal Half-Brother         non-hodgkins lymphoma    Hypertension Paternal Half-Sister     Hypertension Paternal Half-Sister     Cerebrovascular Disease Cousin         51    Cerebrovascular Disease Cousin         58    Breast Cancer Cousin     Breast Cancer Cousin     Prostate Cancer  Other         uncle    Other Cancer Other         liver cancer-uncle    Other Cancer Other         multiple myeloma-uncle    Other Cancer Other         lung cancer-uncle       Prior to Admission medications    Medication Sig Start Date End Date Taking? Authorizing Provider   bisacodyl (DULCOLAX) 5 MG EC tablet Two days prior to exam take two (2) tablets at 4pm. One day prior to exam take two (2) tablets at 4pm 4/3/24   Marvel Mendez MD   clobetasol (TEMOVATE) 0.05 % CREA cream     Reported, Patient   fluticasone (FLONASE) 50 MCG/ACT nasal spray INSTILL 1 SPRAY INTO BOTH NOSTRILS DAILY 5/1/23   Tucker Mack PA-C   hydrochlorothiazide (HYDRODIURIL) 25 MG tablet Take 1 tablet (25 mg) by mouth daily as needed (edema) 4/24/24   Kenia Hanks PA-C   loratadine (CLARITIN) 10 MG tablet Take 10 mg by mouth daily    Reported, Patient   losartan (COZAAR) 50 MG tablet TAKE 1 TABLET BY MOUTH EVERY DAY 2/12/24   Joanie Phillips APRN CNP   metroNIDAZOLE (METROCREAM) 0.75 % external cream Apply topically 2 times daily Apply to face for rosacea 3/20/24   Kenia Hanks PA-C   nystatin (MYCOSTATIN) 737224 UNIT/GM external powder Apply topically 2 times daily as needed for other (for rash to breast) Apply to crease of breast two times per day x 7-10 as needed for rash 2/15/22   Joanie Phillips APRN CNP   polyethylene glycol (GOLYTELY) 236 g suspension Take as directed. Two days before your exam fill the first container with water. Cover and shake until mixed well. At 5:00pm drink one 8oz glass every 10-15 minutes until half (1/2) of the first container is empty. Store the remainder in the refrigerator. One day before your exam at 5:00pm drink the second half of the first container until it is gone. Before you go to bed mix the second container with water and put in refrigerator. Six hours before your check in time drink one 8oz glass every 10-15 minutes until half of container is empty. Discard the remainder of solution.  "4/3/24   Marvel Mendez MD       Allergies   Allergen Reactions    Penicillins Unknown        REVIEW OF SYSTEMS:   5 point ROS negative except as noted above in HPI, including Gen., Resp., CV, GI &  system review.    PHYSICAL EXAM:   There were no vitals taken for this visit. Estimated body mass index is 51.15 kg/m  as calculated from the following:    Height as of 3/20/24: 1.645 m (5' 4.75\").    Weight as of 3/20/24: 138.3 kg (305 lb).   GENERAL APPEARANCE: alert, and oriented  MENTAL STATUS: alert  AIRWAY EXAM: Mallampatti Class I (visualization of the soft palate, fauces, uvula, anterior and posterior pillars)  RESP: lungs clear to auscultation - no rales, rhonchi or wheezes  CV: regular rates and rhythm  DIAGNOSTICS:    Not indicated    IMPRESSION   ASA Class 2 - Mild systemic disease    PLAN:   Plan for Colonoscopy with possible biopsy, possible polypectomy. We discussed the risks, benefits and alternatives and the patient wished to proceed.    The above has been forwarded to the consulting provider.      Signed Electronically by: Marvel Mendez MD  April 30, 2024          "

## 2024-05-02 LAB
PATH REPORT.COMMENTS IMP SPEC: NORMAL
PATH REPORT.COMMENTS IMP SPEC: NORMAL
PATH REPORT.FINAL DX SPEC: NORMAL
PATH REPORT.GROSS SPEC: NORMAL
PATH REPORT.MICROSCOPIC SPEC OTHER STN: NORMAL
PATH REPORT.RELEVANT HX SPEC: NORMAL
PHOTO IMAGE: NORMAL

## 2024-05-17 ENCOUNTER — MYC MEDICAL ADVICE (OUTPATIENT)
Dept: FAMILY MEDICINE | Facility: CLINIC | Age: 54
End: 2024-05-17
Payer: COMMERCIAL

## 2024-05-17 DIAGNOSIS — I10 PRIMARY HYPERTENSION: ICD-10-CM

## 2024-05-17 RX ORDER — LOSARTAN POTASSIUM 50 MG/1
50 TABLET ORAL DAILY
Qty: 90 TABLET | Refills: 1 | Status: SHIPPED | OUTPATIENT
Start: 2024-05-17

## 2024-05-17 NOTE — TELEPHONE ENCOUNTER
Routing to refill pool     Khushi Rosales, Registered Nurse  [facility]

## 2024-06-18 ENCOUNTER — OFFICE VISIT (OUTPATIENT)
Dept: ENDOCRINOLOGY | Facility: CLINIC | Age: 54
End: 2024-06-18
Payer: COMMERCIAL

## 2024-06-18 ENCOUNTER — MYC MEDICAL ADVICE (OUTPATIENT)
Dept: ENDOCRINOLOGY | Facility: CLINIC | Age: 54
End: 2024-06-18

## 2024-06-18 VITALS
BODY MASS INDEX: 48.82 KG/M2 | HEART RATE: 84 BPM | WEIGHT: 293 LBS | DIASTOLIC BLOOD PRESSURE: 93 MMHG | OXYGEN SATURATION: 98 % | HEIGHT: 65 IN | SYSTOLIC BLOOD PRESSURE: 150 MMHG

## 2024-06-18 DIAGNOSIS — E88.810 METABOLIC SYNDROME: ICD-10-CM

## 2024-06-18 DIAGNOSIS — E66.01 CLASS 3 SEVERE OBESITY WITH SERIOUS COMORBIDITY AND BODY MASS INDEX (BMI) OF 50.0 TO 59.9 IN ADULT, UNSPECIFIED OBESITY TYPE (H): Primary | ICD-10-CM

## 2024-06-18 DIAGNOSIS — G47.19 EXCESSIVE DAYTIME SLEEPINESS: ICD-10-CM

## 2024-06-18 DIAGNOSIS — E66.813 CLASS 3 SEVERE OBESITY WITH SERIOUS COMORBIDITY AND BODY MASS INDEX (BMI) OF 50.0 TO 59.9 IN ADULT, UNSPECIFIED OBESITY TYPE (H): Primary | ICD-10-CM

## 2024-06-18 PROCEDURE — 99204 OFFICE O/P NEW MOD 45 MIN: CPT | Performed by: NURSE PRACTITIONER

## 2024-06-18 RX ORDER — CELECOXIB 200 MG/1
200 CAPSULE ORAL
COMMUNITY
Start: 2024-02-03

## 2024-06-18 RX ORDER — SEMAGLUTIDE 0.5 MG/.5ML
0.5 INJECTION, SOLUTION SUBCUTANEOUS
Qty: 2 ML | Refills: 1 | Status: SHIPPED | OUTPATIENT
Start: 2024-06-18

## 2024-06-18 RX ORDER — SEMAGLUTIDE 0.25 MG/.5ML
0.25 INJECTION, SOLUTION SUBCUTANEOUS
Qty: 2 ML | Refills: 0 | Status: SHIPPED | OUTPATIENT
Start: 2024-06-18

## 2024-06-18 ASSESSMENT — SLEEP AND FATIGUE QUESTIONNAIRES
HOW LIKELY ARE YOU TO NOD OFF OR FALL ASLEEP WHILE SITTING INACTIVE IN A PUBLIC PLACE: WOULD NEVER DOZE
HOW LIKELY ARE YOU TO NOD OFF OR FALL ASLEEP WHILE WATCHING TV: SLIGHT CHANCE OF DOZING
HOW LIKELY ARE YOU TO NOD OFF OR FALL ASLEEP WHILE SITTING AND TALKING TO SOMEONE: WOULD NEVER DOZE
HOW LIKELY ARE YOU TO NOD OFF OR FALL ASLEEP WHILE SITTING AND READING: SLIGHT CHANCE OF DOZING
HOW LIKELY ARE YOU TO NOD OFF OR FALL ASLEEP WHEN YOU ARE A PASSENGER IN A CAR FOR AN HOUR WITHOUT A BREAK: SLIGHT CHANCE OF DOZING
HOW LIKELY ARE YOU TO NOD OFF OR FALL ASLEEP WHILE SITTING QUIETLY AFTER LUNCH WITHOUT ALCOHOL: WOULD NEVER DOZE
HOW LIKELY ARE YOU TO NOD OFF OR FALL ASLEEP WHILE LYING DOWN TO REST IN THE AFTERNOON WHEN CIRCUMSTANCES PERMIT: HIGH CHANCE OF DOZING
HOW LIKELY ARE YOU TO NOD OFF OR FALL ASLEEP IN A CAR, WHILE STOPPED FOR A FEW MINUTES IN TRAFFIC: WOULD NEVER DOZE

## 2024-06-18 NOTE — LETTER
"2024       RE: Shital Krueger  [address] [facility] [id number]     Dear Colleague,    Thank you for referring your patient, Shital Krueger, to the [facility]. Please see a copy of my visit note below.    50 minutes spent by me on the date of the encounter doing chart review, history and exam, documentation and further activities per the note    New Medical Weight Management Consult    PATIENT:  Shital Krueger  MRN:         [id number]  :         1970  MATTHEW:         2024    Dear Kenia Hanks PA-C,    I had the pleasure of seeing your patient, Shital Krueger. Full intake/assessment was done to determine barriers to weight loss success and develop a treatment plan. Shital Krueger is a 53 year old female interested in treatment of medical problems associated with excess weight. She has a height of 5' 4.75\", a weight of 309 lbs 0 oz, and the calculated Body mass index is 51.82 kg/m .      Assessment & Plan  Problem List Items Addressed This Visit          Digestive    Class 3 severe obesity with serious comorbidity and body mass index (BMI) of 50.0 to 59.9 in adult, unspecified obesity type (H) - Primary     Very early onset struggles with weight; starting around age 13. Weight more manageable prior to pregnancy at age 40 which she gained to 200lb. Has had difficulty getting below 230lb since then. Has been able to successfully lose weight in the past with WW and meal replacement programs. However, life stressors and family commitments have previously made it difficult to sustain those programs and maintain weight loss. With recent loss of her father she notes she knows she needs to take better care of herself so that she can be more active and be healthier for longer. She is NOT interested in bariatric surgery. She is opposed to knowing her weight currently. Would like to " discuss  antiobesity medications. Comorbidities include HTN, HLD (high LDL), metabolic syndrome, joint pain, fatigue, and symptoms of sleep apnea, and abdominal hernia.     Previously referred to sleep medicine a few years ago. Open to referral now.     Describes strong reward/ comfort pathway with food. Discussed role of  antiobesity medications. Discussed options of metformin for metabolic syndrome with components of contrave VS wegovy. Think she could benefit from both treatment options. Will start by seeing what cost of wegovy will be and have contrave/ metformin as back up plan. May be able to get oral meds started sooner but likely wegovy can be more effective long term.       Schedule with dietitian in 2-3 weeks  Schedule with pharmacist in 2-3 weeks  Start wegovy   Back up plan bupropion and naltrexone   Follow up 2-3 months with me   Work on protein 60g minimum  Hydration - 64oz minimum          Relevant Medications    Semaglutide-Weight Management (WEGOVY) 0.25 MG/0.5ML pen    Semaglutide-Weight Management (WEGOVY) 0.5 MG/0.5ML pen    Other Relevant Orders    Adult Sleep Eval & Management  Referral    Med Therapy Management Referral       Endocrine    Metabolic syndrome    Relevant Medications    Semaglutide-Weight Management (WEGOVY) 0.25 MG/0.5ML pen    Semaglutide-Weight Management (WEGOVY) 0.5 MG/0.5ML pen     Other Visit Diagnoses       Excessive daytime sleepiness        Relevant Medications    Semaglutide-Weight Management (WEGOVY) 0.25 MG/0.5ML pen    Semaglutide-Weight Management (WEGOVY) 0.5 MG/0.5ML pen    Other Relevant Orders    Adult Sleep Eval & Management  Referral             She has the following co-morbidities:        6/18/2024    11:03 AM   --   I have the following health issues associated with obesity High Blood Pressure    GERD (Reflux)   I have the following symptoms associated with obesity Knee Pain    Lower Extremity Swelling    Fatigue    Groin Rash     Heart  "burn very rarely   Sleep: +snoring, wakes up often to go to the bathroom, would like to nap but not inappropriately falling asleep, never wakes rested always tired   Abdominal hernia     Metabolic syndrome - high LDL, HTN, central adiposity         6/18/2024    11:03 AM   Referring Provider   Please name the provider who referred you to Medical Weight Management  If you do not know, please answer \"I Don't Know\" i dont know           6/18/2024    11:03 AM   Weight History   How concerned are you about your weight? Very Concerned   The following factors have contributed to my weight gain Change in Schedule    Eating Wrong Types of Food    Eating Too Much    Lack of Exercise    Stress   My lowest weight since age 19 was 140   My highest weight since age 19 was 315   The most weight I have ever lost was (lbs) 315   I have the following family history of obesity/being overweight My mother is overweight    My father is overweight    One or more of my siblings are overweight   How has your weight changed over the last year? Gained   Has struggled with weight since age 13. Not over 200lb until 42yo after first pregnancy     Very successful on WW in the past but family stress during traumatic event made it difficult to continue on this. Chronic stress from there on making difficult to care for self.     2019 lost 45lb - plateau and had a lot of family drama at that time - leading into 2020 when she was very socially isolated and weight has continued to escalated      NOT interested in surgery currently. Has seen people regain weight after surgery.     Wants to feel more healthy. Wants to be able to be more active but currently limited. Wants to take care of her self better.     Significant family history of obesity.         6/18/2024    11:03 AM   Diet Recall Review with Patient   If you do eat breakfast, what types of food do you eat? carbs   If you do eat lunch, what types of food do you typically eat? lean cuisine   If " you do eat supper, what types of food do you typically eat? pizza pasta burgers or chicken   If you do snack, what types of food do you typically eat? chips cookies   How many glasses of juice do you drink in a typical day? 0   How many of glasses of milk do you drink in a typical day? 1   If you do drink milk, what type? 1%   How many 8oz glasses of sugar containing drinks such as Chava-Aid/sweet tea do you drink in a day? 0   How many cans/bottles of sugar pop/soda/tea/sports drinks do you drink in a day? 1   How many cans/bottles of diet pop/soda/tea or sports drink do you drink in a day? 6   If you do drink, how many drinks might you have in a day? 1 or 2      has limited food options medically  Daughter is very picky eater         6/18/2024    11:03 AM   Eating Habits   Generally, my meals include foods like these bread, pasta, rice, potatoes, corn, crackers, sweet dessert, pop, or juice Almost Everyday   Generally, my meals include foods like these fried meats, brats, burgers, french fries, pizza, cheese, chips, or ice cream Almost Everyday   Eat fast food (like McDonalds, Burger Sonny, Taco Bell) A Few Times a Week   Eat at a buffet or sit-down restaurant Less Than Weekly   Eat most of my meals in front of the TV or computer A Few Times a Week   Often skip meals, eat at random times, have no regular eating times A Few Times a Week   Rarely sit down for a meal but snack or graze throughout A Few Times a Week   Eat extra snacks between meals A Few Times a Week   Eat most of my food at the end of the day A Few Times a Week   Eat in the middle of the night or wake up at night to eat Never   Eat extra snacks to prevent or correct low blood sugar Never   Eat to prevent acid reflux or stomach pain Never   Worry about not having enough food to eat Never   I eat when I am depressed A Few Times a Week   I eat when I am stressed Almost Everyday   I eat when I am bored Less Than Weekly   I eat when I am anxious A  Few Times a Week   I eat when I am happy or as a reward A Few Times a Week   I feel hungry all the time even if I just have eaten Never   Feeling full is important to me Never   I finish all the food on my plate even if I am already full Less Than Weekly   I can't resist eating delicious food or walk past the good food/smell Less Than Weekly   I eat/snack without noticing that I am eating Never   I eat when I am preparing the meal A Few Times a Week   I eat more than usual when I see others eating Never   I have trouble not eating sweets, ice cream, cookies, or chips if they are around the house A Few Times a Week   I think about food all day Less Than Weekly   What foods, if any, do you crave? Cheese   Please list any other foods you crave? sweets carbs cheese     Tries to eat 3 meals a day  Eats dinner later due to schedule   Sometimes skipping breakfast   Emotional eating/ stress eating- comfort / pleasure         6/18/2024    11:03 AM   Amount of Food   I feel out of control when eating Never   I eat a large amount of food, like a loaf of bread, a box of cookies, a pint/quart of ice cream, all at once Monthly   I eat a large amount of food even when I am not hungry Never   I eat rapidly Everyday   I eat alone because I feel embarrassed and do not want others to see how much I have eaten Weekly   I eat until I am uncomfortably full Monthly   I feel bad, disgusted, or guilty after I overeat Never           6/18/2024    11:03 AM   Activity/Exercise History   How much of a typical 12 hour day do you spend lying down? Less Than Half the Day   How much of a typical day do you spend walking/standing? Half the Day   How many hours (not including work) do you spend on the TV/Video Games/Computer/Tablet/Phone? 4-5 Hours   How many times a week are you active for the purpose of exercise? Once a Week   What keeps you from being more active? Pain    Shortness of Breath    Too tired   How many total minutes do you spend doing  some activity for the purpose of exercising when you exercise? Less Than 15 Minutes       PAST MEDICAL HISTORY:  No past medical history on file.        6/18/2024    11:03 AM   Work/Social History Reviewed With Patient   My employment status is Part-Time   My job is    How much of your job is spent on the computer or phone? 100%   How many hours do you spend commuting to work daily? one   What is your marital status? /In a Relationship   If in a relationship, is your significant other overweight? No   If you have children, are they overweight? No   Who does the food shopping? me     Mother starting cancer treatment - lots of help with this   Daughter is 14yo     Works from home now         6/18/2024    11:03 AM   Mental Health History Reviewed With Patient   Have you ever been physically or sexually abused? No   How often in the past 2 weeks have you felt little interest or pleasure in doing things? Not at all   Over the past 2 weeks how often have you felt down, depressed, or hopeless? Not at all     Lots of family stress and trying to manage other needs  Just lost her father who wished he'd taken care of himself better         6/18/2024    11:03 AM   Sleep History Reviewed With Patient   How many hours do you sleep at night? 6       MEDICATIONS:   Current Outpatient Medications   Medication Sig Dispense Refill    bisacodyl (DULCOLAX) 5 MG EC tablet Two days prior to exam take two (2) tablets at 4pm. One day prior to exam take two (2) tablets at 4pm 4 tablet 0    celecoxib (CELEBREX) 200 MG capsule 200 mg      clobetasol (TEMOVATE) 0.05 % CREA cream       fluticasone (FLONASE) 50 MCG/ACT nasal spray INSTILL 1 SPRAY INTO BOTH NOSTRILS DAILY 16 mL 11    hydrochlorothiazide (HYDRODIURIL) 25 MG tablet Take 1 tablet (25 mg) by mouth daily as needed (edema) 90 tablet 2    loratadine (CLARITIN) 10 MG tablet Take 10 mg by mouth daily      losartan (COZAAR) 50 MG tablet Take 1 tablet (50 mg) by mouth  "daily 90 tablet 1    metroNIDAZOLE (METROCREAM) 0.75 % external cream Apply topically 2 times daily Apply to face for rosacea 45 g 1    nystatin (MYCOSTATIN) 874141 UNIT/GM external powder Apply topically 2 times daily as needed for other (for rash to breast) Apply to crease of breast two times per day x 7-10 as needed for rash 60 g 0    Semaglutide-Weight Management (WEGOVY) 0.25 MG/0.5ML pen Inject 0.25 mg Subcutaneous every 7 days 2 mL 0    Semaglutide-Weight Management (WEGOVY) 0.5 MG/0.5ML pen Inject 0.5 mg Subcutaneous every 7 days 2 mL 1       ALLERGIES:   Allergies   Allergen Reactions    Penicillins Unknown           Anti-obesity medication ROS:    HEENT  Hx of glaucoma: No    Cardiovascular  CAD:No  HTN:Yes    Gastrointestinal  GERD:No  Constipation/diarrhea/GI issues:No  Liver Dz:No  Computed FIB-4 Calculation unavailable. One or more values for this score either were not found within the given timeframe or did not fit some other criterion.    H/O Pancreatitis:No    Psychiatric  Bipolar: No  Anxiety:yes, never treated   Depression:Yes  History of alcohol/drug abuse: No  Hx of eating disorder:No    Endocrine  Personal or family hx of MTC or MEN2:No  Diabetes/prediabetes: No    Neurologic:  Hx of seizures: No  Hx of migraines: No  Memory Impairment: No  Chronic pain/opioids use: No      History of kidney stones: No  Kidney disease: No  Current birth control:  perimenopausal        2/14/2022    10:34 AM 6/18/2024    10:46 AM   SUPRIYA Score (Last Two)   SUPRIYA Raw Score 40 34   Activation Score 100 68.9   SUPRIYA Level 4 3         PHYSICAL EXAM:  Objective   BP (!) 150/93 (BP Location: Left arm, Patient Position: Sitting, Cuff Size: Adult Regular)   Pulse 84   Ht 1.645 m (5' 4.75\")   Wt 140.2 kg (309 lb)   SpO2 98%   BMI 51.82 kg/m    Physical Exam   GENERAL: alert and no distress  EYES: Eyes grossly normal to inspection.  No discharge or erythema, or obvious scleral/conjunctival abnormalities.  RESP: No audible " wheeze, cough, or visible cyanosis.    SKIN: Visible skin clear. No significant rash, abnormal pigmentation or lesions.  NEURO: Cranial nerves grossly intact.  Mentation and speech appropriate for age.  PSYCH: Appropriate affect, tone, and pace of words     Computed FIB-4 Calculation unavailable. One or more values for this score either were not found within the given timeframe or did not fit some other criterion.    Fib-4 < 1.3: No further evaluation at this point, unless other concerns  - If the Fib-4 is > 2.67,  Fibroscan and elective liver clinic referral  - Intermediate Fib-4 scores: Get a Fibroscan, consider repeating this in 1-2 years.    Sincerely,    Kenia Ferris NP

## 2024-06-18 NOTE — NURSING NOTE
"(   Chief Complaint   Patient presents with    New Patient     Weight management    )    ( Weight: 140.2 kg (309 lb) )  ( Height: 164.5 cm (5' 4.75\") )  ( BMI (Calculated): 51.82 )  (   )  ( Cumulative weight loss (lbs): 0 )  (   )  (   )  (   )  (   )    ( BP: (!) 150/93 )  (   )  (   )  (   )  ( Pulse: 84 )  (   )  ( SpO2: 98 % )    (   Patient Active Problem List   Diagnosis    Morbid obesity (H)    HTN (hypertension)    Rosacea    Yeast dermatitis    )  (   Current Outpatient Medications   Medication Sig Dispense Refill    bisacodyl (DULCOLAX) 5 MG EC tablet Two days prior to exam take two (2) tablets at 4pm. One day prior to exam take two (2) tablets at 4pm 4 tablet 0    celecoxib (CELEBREX) 200 MG capsule 200 mg      clobetasol (TEMOVATE) 0.05 % CREA cream       fluticasone (FLONASE) 50 MCG/ACT nasal spray INSTILL 1 SPRAY INTO BOTH NOSTRILS DAILY 16 mL 11    hydrochlorothiazide (HYDRODIURIL) 25 MG tablet Take 1 tablet (25 mg) by mouth daily as needed (edema) 90 tablet 2    loratadine (CLARITIN) 10 MG tablet Take 10 mg by mouth daily      losartan (COZAAR) 50 MG tablet Take 1 tablet (50 mg) by mouth daily 90 tablet 1    metroNIDAZOLE (METROCREAM) 0.75 % external cream Apply topically 2 times daily Apply to face for rosacea 45 g 1    nystatin (MYCOSTATIN) 235495 UNIT/GM external powder Apply topically 2 times daily as needed for other (for rash to breast) Apply to crease of breast two times per day x 7-10 as needed for rash 60 g 0    polyethylene glycol (GOLYTELY) 236 g suspension Take as directed. Two days before your exam fill the first container with water. Cover and shake until mixed well. At 5:00pm drink one 8oz glass every 10-15 minutes until half (1/2) of the first container is empty. Store the remainder in the refrigerator. One day before your exam at 5:00pm drink the second half of the first container until it is gone. Before you go to bed mix the second container with water and put in refrigerator. Six " hours before your check in time drink one 8oz glass every 10-15 minutes until half of container is empty. Discard the remainder of solution. 8000 mL 0    )  ( Diabetes Eval:    )    ( Pain Eval:  Data Unavailable )    ( Wound Eval:       )    (   History   Smoking Status    Every Day    Types: Cigarettes   Smokeless Tobacco    Never    )    ( Signed By:  Jodie Patel; June 18, 2024; 11:21 AM )

## 2024-06-18 NOTE — ASSESSMENT & PLAN NOTE
Very early onset struggles with weight; starting around age 13. Weight more manageable prior to pregnancy at age 40 which she gained to 200lb. Has had difficulty getting below 230lb since then. Has been able to successfully lose weight in the past with WW and meal replacement programs. However, life stressors and family commitments have previously made it difficult to sustain those programs and maintain weight loss. With recent loss of her father she notes she knows she needs to take better care of herself so that she can be more active and be healthier for longer. She is NOT interested in bariatric surgery. She is opposed to knowing her weight currently. Would like to discuss  antiobesity medications. Comorbidities include HTN, HLD (high LDL), metabolic syndrome, joint pain, fatigue, and symptoms of sleep apnea, and abdominal hernia.     Previously referred to sleep medicine a few years ago. Open to referral now.     Describes strong reward/ comfort pathway with food. Discussed role of  antiobesity medications. Discussed options of metformin for metabolic syndrome with components of contrave VS wegovy. Think she could benefit from both treatment options. Will start by seeing what cost of wegovy will be and have contrave/ metformin as back up plan. May be able to get oral meds started sooner but likely wegovy can be more effective long term.       Schedule with dietitian in 2-3 weeks  Schedule with pharmacist in 2-3 weeks  Start wegovy   Back up plan bupropion and naltrexone   Follow up 2-3 months with me   Work on protein 60g minimum  Hydration - 64oz minimum

## 2024-06-18 NOTE — PROGRESS NOTES
"50 minutes spent by me on the date of the encounter doing chart review, history and exam, documentation and further activities per the note    New Medical Weight Management Consult    PATIENT:  Shital Krueger  MRN:         [id number]  :         1970  MATTHEW:         2024    Dear Kenia Hanks PA-C,    I had the pleasure of seeing your patient, Shital Krueger. Full intake/assessment was done to determine barriers to weight loss success and develop a treatment plan. Shital Krueger is a 53 year old female interested in treatment of medical problems associated with excess weight. She has a height of 5' 4.75\", a weight of 309 lbs 0 oz, and the calculated Body mass index is 51.82 kg/m .      Assessment & Plan   Problem List Items Addressed This Visit          Digestive    Class 3 severe obesity with serious comorbidity and body mass index (BMI) of 50.0 to 59.9 in adult, unspecified obesity type (H) - Primary     Very early onset struggles with weight; starting around age 13. Weight more manageable prior to pregnancy at age 40 which she gained to 200lb. Has had difficulty getting below 230lb since then. Has been able to successfully lose weight in the past with WW and meal replacement programs. However, life stressors and family commitments have previously made it difficult to sustain those programs and maintain weight loss. With recent loss of her father she notes she knows she needs to take better care of herself so that she can be more active and be healthier for longer. She is NOT interested in bariatric surgery. She is opposed to knowing her weight currently. Would like to discuss  antiobesity medications. Comorbidities include HTN, HLD (high LDL), metabolic syndrome, joint pain, fatigue, and symptoms of sleep apnea, and abdominal hernia.     Previously referred to sleep medicine a few years ago. Open to referral now.     Describes strong reward/ comfort pathway with food. Discussed role of  antiobesity " medications. Discussed options of metformin for metabolic syndrome with components of contrave VS wegovy. Think she could benefit from both treatment options. Will start by seeing what cost of wegovy will be and have contrave/ metformin as back up plan. May be able to get oral meds started sooner but likely wegovy can be more effective long term.       Schedule with dietitian in 2-3 weeks  Schedule with pharmacist in 2-3 weeks  Start wegovy   Back up plan bupropion and naltrexone   Follow up 2-3 months with me   Work on protein 60g minimum  Hydration - 64oz minimum          Relevant Medications    Semaglutide-Weight Management (WEGOVY) 0.25 MG/0.5ML pen    Semaglutide-Weight Management (WEGOVY) 0.5 MG/0.5ML pen    Other Relevant Orders    Adult Sleep Eval & Management  Referral    Med Therapy Management Referral       Endocrine    Metabolic syndrome    Relevant Medications    Semaglutide-Weight Management (WEGOVY) 0.25 MG/0.5ML pen    Semaglutide-Weight Management (WEGOVY) 0.5 MG/0.5ML pen     Other Visit Diagnoses       Excessive daytime sleepiness        Relevant Medications    Semaglutide-Weight Management (WEGOVY) 0.25 MG/0.5ML pen    Semaglutide-Weight Management (WEGOVY) 0.5 MG/0.5ML pen    Other Relevant Orders    Adult Sleep Eval & Management  Referral             She has the following co-morbidities:        6/18/2024    11:03 AM   --   I have the following health issues associated with obesity High Blood Pressure    GERD (Reflux)   I have the following symptoms associated with obesity Knee Pain    Lower Extremity Swelling    Fatigue    Groin Rash     Heart burn very rarely   Sleep: +snoring, wakes up often to go to the bathroom, would like to nap but not inappropriately falling asleep, never wakes rested always tired   Abdominal hernia     Metabolic syndrome - high LDL, HTN, central adiposity         6/18/2024    11:03 AM   Referring Provider   Please name the provider who referred you to  "Medical Weight Management  If you do not know, please answer \"I Don't Know\" i dont know           6/18/2024    11:03 AM   Weight History   How concerned are you about your weight? Very Concerned   The following factors have contributed to my weight gain Change in Schedule    Eating Wrong Types of Food    Eating Too Much    Lack of Exercise    Stress   My lowest weight since age 19 was 140   My highest weight since age 19 was 315   The most weight I have ever lost was (lbs) 315   I have the following family history of obesity/being overweight My mother is overweight    My father is overweight    One or more of my siblings are overweight   How has your weight changed over the last year? Gained   Has struggled with weight since age 13. Not over 200lb until 42yo after first pregnancy     Very successful on WW in the past but family stress during traumatic event made it difficult to continue on this. Chronic stress from there on making difficult to care for self.     2019 lost 45lb - plateau and had a lot of family drama at that time - leading into 2020 when she was very socially isolated and weight has continued to escalated      NOT interested in surgery currently. Has seen people regain weight after surgery.     Wants to feel more healthy. Wants to be able to be more active but currently limited. Wants to take care of her self better.     Significant family history of obesity.         6/18/2024    11:03 AM   Diet Recall Review with Patient   If you do eat breakfast, what types of food do you eat? carbs   If you do eat lunch, what types of food do you typically eat? lean cuisine   If you do eat supper, what types of food do you typically eat? pizza pasta burgers or chicken   If you do snack, what types of food do you typically eat? chips cookies   How many glasses of juice do you drink in a typical day? 0   How many of glasses of milk do you drink in a typical day? 1   If you do drink milk, what type? 1%   How many 8oz " glasses of sugar containing drinks such as Chava-Aid/sweet tea do you drink in a day? 0   How many cans/bottles of sugar pop/soda/tea/sports drinks do you drink in a day? 1   How many cans/bottles of diet pop/soda/tea or sports drink do you drink in a day? 6   If you do drink, how many drinks might you have in a day? 1 or 2      has limited food options medically  Daughter is very picky eater         6/18/2024    11:03 AM   Eating Habits   Generally, my meals include foods like these bread, pasta, rice, potatoes, corn, crackers, sweet dessert, pop, or juice Almost Everyday   Generally, my meals include foods like these fried meats, brats, burgers, french fries, pizza, cheese, chips, or ice cream Almost Everyday   Eat fast food (like McDonalds, Burger Sonny, Taco Bell) A Few Times a Week   Eat at a buffet or sit-down restaurant Less Than Weekly   Eat most of my meals in front of the TV or computer A Few Times a Week   Often skip meals, eat at random times, have no regular eating times A Few Times a Week   Rarely sit down for a meal but snack or graze throughout A Few Times a Week   Eat extra snacks between meals A Few Times a Week   Eat most of my food at the end of the day A Few Times a Week   Eat in the middle of the night or wake up at night to eat Never   Eat extra snacks to prevent or correct low blood sugar Never   Eat to prevent acid reflux or stomach pain Never   Worry about not having enough food to eat Never   I eat when I am depressed A Few Times a Week   I eat when I am stressed Almost Everyday   I eat when I am bored Less Than Weekly   I eat when I am anxious A Few Times a Week   I eat when I am happy or as a reward A Few Times a Week   I feel hungry all the time even if I just have eaten Never   Feeling full is important to me Never   I finish all the food on my plate even if I am already full Less Than Weekly   I can't resist eating delicious food or walk past the good food/smell Less Than Weekly    I eat/snack without noticing that I am eating Never   I eat when I am preparing the meal A Few Times a Week   I eat more than usual when I see others eating Never   I have trouble not eating sweets, ice cream, cookies, or chips if they are around the house A Few Times a Week   I think about food all day Less Than Weekly   What foods, if any, do you crave? Cheese   Please list any other foods you crave? sweets carbs cheese     Tries to eat 3 meals a day  Eats dinner later due to schedule   Sometimes skipping breakfast   Emotional eating/ stress eating- comfort / pleasure         6/18/2024    11:03 AM   Amount of Food   I feel out of control when eating Never   I eat a large amount of food, like a loaf of bread, a box of cookies, a pint/quart of ice cream, all at once Monthly   I eat a large amount of food even when I am not hungry Never   I eat rapidly Everyday   I eat alone because I feel embarrassed and do not want others to see how much I have eaten Weekly   I eat until I am uncomfortably full Monthly   I feel bad, disgusted, or guilty after I overeat Never           6/18/2024    11:03 AM   Activity/Exercise History   How much of a typical 12 hour day do you spend lying down? Less Than Half the Day   How much of a typical day do you spend walking/standing? Half the Day   How many hours (not including work) do you spend on the TV/Video Games/Computer/Tablet/Phone? 4-5 Hours   How many times a week are you active for the purpose of exercise? Once a Week   What keeps you from being more active? Pain    Shortness of Breath    Too tired   How many total minutes do you spend doing some activity for the purpose of exercising when you exercise? Less Than 15 Minutes       PAST MEDICAL HISTORY:  No past medical history on file.        6/18/2024    11:03 AM   Work/Social History Reviewed With Patient   My employment status is Part-Time   My job is    How much of your job is spent on the computer or phone?  100%   How many hours do you spend commuting to work daily? one   What is your marital status? /In a Relationship   If in a relationship, is your significant other overweight? No   If you have children, are they overweight? No   Who does the food shopping? me     Mother starting cancer treatment - lots of help with this   Daughter is 12yo     Works from home now         6/18/2024    11:03 AM   Mental Health History Reviewed With Patient   Have you ever been physically or sexually abused? No   How often in the past 2 weeks have you felt little interest or pleasure in doing things? Not at all   Over the past 2 weeks how often have you felt down, depressed, or hopeless? Not at all     Lots of family stress and trying to manage other needs  Just lost her father who wished he'd taken care of himself better         6/18/2024    11:03 AM   Sleep History Reviewed With Patient   How many hours do you sleep at night? 6       MEDICATIONS:   Current Outpatient Medications   Medication Sig Dispense Refill    bisacodyl (DULCOLAX) 5 MG EC tablet Two days prior to exam take two (2) tablets at 4pm. One day prior to exam take two (2) tablets at 4pm 4 tablet 0    celecoxib (CELEBREX) 200 MG capsule 200 mg      clobetasol (TEMOVATE) 0.05 % CREA cream       fluticasone (FLONASE) 50 MCG/ACT nasal spray INSTILL 1 SPRAY INTO BOTH NOSTRILS DAILY 16 mL 11    hydrochlorothiazide (HYDRODIURIL) 25 MG tablet Take 1 tablet (25 mg) by mouth daily as needed (edema) 90 tablet 2    loratadine (CLARITIN) 10 MG tablet Take 10 mg by mouth daily      losartan (COZAAR) 50 MG tablet Take 1 tablet (50 mg) by mouth daily 90 tablet 1    metroNIDAZOLE (METROCREAM) 0.75 % external cream Apply topically 2 times daily Apply to face for rosacea 45 g 1    nystatin (MYCOSTATIN) 596435 UNIT/GM external powder Apply topically 2 times daily as needed for other (for rash to breast) Apply to crease of breast two times per day x 7-10 as needed for rash 60 g 0     "Semaglutide-Weight Management (WEGOVY) 0.25 MG/0.5ML pen Inject 0.25 mg Subcutaneous every 7 days 2 mL 0    Semaglutide-Weight Management (WEGOVY) 0.5 MG/0.5ML pen Inject 0.5 mg Subcutaneous every 7 days 2 mL 1       ALLERGIES:   Allergies   Allergen Reactions    Penicillins Unknown           Anti-obesity medication ROS:    HEENT  Hx of glaucoma: No    Cardiovascular  CAD:No  HTN:Yes    Gastrointestinal  GERD:No  Constipation/diarrhea/GI issues:No  Liver Dz:No  Computed FIB-4 Calculation unavailable. One or more values for this score either were not found within the given timeframe or did not fit some other criterion.    H/O Pancreatitis:No    Psychiatric  Bipolar: No  Anxiety:yes, never treated   Depression:Yes  History of alcohol/drug abuse: No  Hx of eating disorder:No    Endocrine  Personal or family hx of MTC or MEN2:No  Diabetes/prediabetes: No    Neurologic:  Hx of seizures: No  Hx of migraines: No  Memory Impairment: No  Chronic pain/opioids use: No      History of kidney stones: No  Kidney disease: No  Current birth control:  perimenopausal        2/14/2022    10:34 AM 6/18/2024    10:46 AM   SUPRIYA Score (Last Two)   SUPRIYA Raw Score 40 34   Activation Score 100 68.9   SUPRIYA Level 4 3         PHYSICAL EXAM:  Objective    BP (!) 150/93 (BP Location: Left arm, Patient Position: Sitting, Cuff Size: Adult Regular)   Pulse 84   Ht 1.645 m (5' 4.75\")   Wt 140.2 kg (309 lb)   SpO2 98%   BMI 51.82 kg/m    Physical Exam   GENERAL: alert and no distress  EYES: Eyes grossly normal to inspection.  No discharge or erythema, or obvious scleral/conjunctival abnormalities.  RESP: No audible wheeze, cough, or visible cyanosis.    SKIN: Visible skin clear. No significant rash, abnormal pigmentation or lesions.  NEURO: Cranial nerves grossly intact.  Mentation and speech appropriate for age.  PSYCH: Appropriate affect, tone, and pace of words     Computed FIB-4 Calculation unavailable. One or more values for this score either " were not found within the given timeframe or did not fit some other criterion.    Fib-4 < 1.3: No further evaluation at this point, unless other concerns  - If the Fib-4 is > 2.67,  Fibroscan and elective liver clinic referral  - Intermediate Fib-4 scores: Get a Fibroscan, consider repeating this in 1-2 years.    Sincerely,    Kenia Ferris, NP

## 2024-06-18 NOTE — PATIENT INSTRUCTIONS
"Bertram Snider, it was nice to meet you today!  Thank you for allowing us the privilege of caring for you. We hope we provided you with the excellent service you deserve.   Please let us know if there is anything else we can do for you so that we can be sure you are completely satisfied with your care experience.    To ensure the quality of our services you may be receiving a patient satisfaction survey from an independent patient satisfaction monitoring company.    The greatest compliment you can give is a \"Likely to Recommend\"    Your visit was with Kenia Ferris NP today.    Instructions per today's visit:     Follow up  plan:  Schedule with dietitian in 2-3 weeks  Schedule with pharmacist in 2-3 weeks  Start wegovy   Back up plan bupropion and naltrexone   Follow up 2-3 months with me   Work on protein 60g minimum  Hydration - 64oz minimum   ___________________________________________________________________________  Important contact and scheduling information:  Please call our contact center at [phone] to schedule your next appointments.  For any nursing questions or concerns call Sarah Howard LPN at [phone] or Renate Mark RN at [phone]  Please call during clinic hours Monday through Friday 8:00a - 4:00p if you have questions or you can contact us via [employer] at anytime and we will reply during clinic hours.    Lab results will be communicated through My Chart or letter (if My Chart not used). Please call the clinic if you have not received communication after 1 week or if you have any questions.?  Clinic Fax: [phone]  __________________________________________________________________________    If labs were ordered today:    Please make an appointment to have them drawn at your convenience.     To schedule the Lab Appointment using [employer]:  Select \"Schedule an Appointment\"  Select \"Lab Only\"  For \"A couple of questions\", select \"Other\"  For \"Which locations work for you?, select the location " and set up the appointment    To schedule by phone call [phone] to schedule a lab only appointment at any [facility] lab.  ___________________________________________________________________________  Work with A [facility] !  Virtual Sessions are Available through [facility] Clinics    To learn more, call to schedule a free, Health  Q&A appointment: [phone]     What is Health Coaching?  Do you know what you are supposed to do, but you just aren't doing it?  Then, HEALTH COACHING may help you!   Get unstuck and move forward with the support of a professionally trained NBC-HWC (National Board-Certified Health and ) who uses evidence-based approaches to help you move forward with healthy lifestyle changes in the areas of weight loss, stress management and overall well-being.    Health Coaches help you identify goals that will work best for you. Health Coaches provide support and encouragement with overcoming barriers and help you to find inspiration and motivation to lead a healthy lifestyle.    Option one:  Health Coaching 3-Pack; Three, 30-minute Health Coaching Visits, for $99  Visits are done virtually (phone or video)  This is a self pay service; we do not accept insurance for catrachita coaching.    Option two:   The 24 week Plan; 11 Health Coaching Visits, and a 7 months subscription to [employer]-- on-demand fitness, nutrition and mindfulness classes, for $499 (employee discounts may be available). Participants will also meet regularly with a weight management Medical Provider and a Registered/Licensed Dietician.  This is a self-pay service; we do not accept insurance for health coaching.    To Schedule a free Health  Q&A appointment to learn more,  call [phone].  ____________________________________________________________________  [facility]  Healthy Lifestyle Group    Healthy Lifestyle Group  This  "is a 60 minute virtual coaching group for those who want to lead a healthier lifestyle. Come together to set goals and overcome barriers in a supportive group environment. We will address the four pillars of health--nutrition, exercise, sleep and emotional well-being.  This group is highly recommended for those who are participating in the 24 week Healthy Lifestyle Plan and our Health Coaching sessions.    WHEN: This group meets the first Friday of the month, 12:30 PM - 1:30 PM online, via a zoom meeting.      FACILITATOR: Led by National Board Certified Health and , María Ross, [facility].    TO REGISTER: Please call the Call Center at [phone] to register. You will get an appointment to attend in [employer]. Fifteen minutes prior to the meeting, complete the e-check in and you will get the link to join the meeting.  There is no charge to attend this group and space is limited.      2023 and 2024 Meeting Topics and Dates:    November 3: Introduction to Mindfulness (Learn simple and effective mindfulness practices and how it can benefit you)    December 8: Let's Talk (guided discussion on our wins and challenges)    January 5: New Years Vision: Manifest your Best 2024! (Guided imagery,  journaling and discussion)    February 2: Let's Talk    March 1: 10 Percent Happier by Medhat Garrido (Book Bites; a guided discussion on the nuggets of wisdom from favorite wellness books; no need to read the book but highly encouraged)    April 5: Let's Talk    May 3: \"Essentialism; The Disciplined Pursuit of Less by Koby Whitaker (book bites discussion)    June 7: Let's Talk    July 5: NO MEETING, off for the 4th of July Holiday    August 2: The Blue Zones, Secrets for Living a Longer Life by Medhat Rodriguez (book bites discussion)      If you would like bariatric surgery specific support group info please let your care team know.         Thank you,   [facility] Comprehensive Weight Management Team                       "

## 2024-06-20 ENCOUNTER — TELEPHONE (OUTPATIENT)
Dept: ENDOCRINOLOGY | Facility: CLINIC | Age: 54
End: 2024-06-20
Payer: COMMERCIAL

## 2024-06-20 NOTE — TELEPHONE ENCOUNTER
MTM referral from: [facility] visit (referral by provider)    MTM referral outreach attempt #2 on June 20, 2024 at 12:50 PM      Outcome: Patient not reachable after several attempts, routed to Pharmacist Team/Provider as an FYI    Use HBC for the carrier/Plan on the flowsheet      [employer] Message Sent    CRISPIN Mir

## 2024-06-25 ASSESSMENT — PATIENT HEALTH QUESTIONNAIRE - PHQ9
SUM OF ALL RESPONSES TO PHQ QUESTIONS 1-9: 3
SUM OF ALL RESPONSES TO PHQ QUESTIONS 1-9: 3
10. IF YOU CHECKED OFF ANY PROBLEMS, HOW DIFFICULT HAVE THESE PROBLEMS MADE IT FOR YOU TO DO YOUR WORK, TAKE CARE OF THINGS AT HOME, OR GET ALONG WITH OTHER PEOPLE: NOT DIFFICULT AT ALL

## 2024-06-25 NOTE — TELEPHONE ENCOUNTER
MTM Referral outreach attempt #3 was made on 06/25/2024.          Outcome: Sent patient MyChart message explaining more in-depth what MTM is and how we can best support them. Attached ability to schedule from message, as well as offered opportunity to call me directly for help with scheduling.

## 2024-06-26 NOTE — TELEPHONE ENCOUNTER
12/29/2017    10:37 AM 2/15/2022     8:43 AM 6/25/2024    12:51 PM   PHQ   PHQ-9 Total Score 2 2 3   Q9: Thoughts of better off dead/self-harm past 2 weeks Not at all Not at all Not at all         12/29/2017    10:37 AM 2/15/2022     8:44 AM   SHERRY-7 SCORE   Total Score  1 (minimal anxiety)   Total Score 1 1         
states she had tinnitus in her left ear earlier in the week but has since resolved

## 2024-07-23 DIAGNOSIS — E88.810 METABOLIC SYNDROME: ICD-10-CM

## 2024-07-23 DIAGNOSIS — E66.01 CLASS 3 SEVERE OBESITY WITH SERIOUS COMORBIDITY AND BODY MASS INDEX (BMI) OF 50.0 TO 59.9 IN ADULT, UNSPECIFIED OBESITY TYPE (H): ICD-10-CM

## 2024-07-23 DIAGNOSIS — E66.813 CLASS 3 SEVERE OBESITY WITH SERIOUS COMORBIDITY AND BODY MASS INDEX (BMI) OF 50.0 TO 59.9 IN ADULT, UNSPECIFIED OBESITY TYPE (H): ICD-10-CM

## 2024-07-23 DIAGNOSIS — G47.19 EXCESSIVE DAYTIME SLEEPINESS: ICD-10-CM

## 2024-07-29 RX ORDER — SEMAGLUTIDE 0.25 MG/.5ML
0.25 INJECTION, SOLUTION SUBCUTANEOUS
Qty: 2 ML | Refills: 0 | OUTPATIENT
Start: 2024-07-29

## 2024-07-30 ENCOUNTER — MYC MEDICAL ADVICE (OUTPATIENT)
Dept: FAMILY MEDICINE | Facility: CLINIC | Age: 54
End: 2024-07-30
Payer: COMMERCIAL

## 2024-07-30 ENCOUNTER — TELEPHONE (OUTPATIENT)
Dept: FAMILY MEDICINE | Facility: CLINIC | Age: 54
End: 2024-07-30
Payer: COMMERCIAL

## 2024-07-30 NOTE — TELEPHONE ENCOUNTER
Patient Quality Outreach    Patient is due for the following:   Hypertension -  BP check      Topic Date Due    Pneumococcal Vaccine (1 of 2 - PCV) Never done    Hepatitis B Vaccine (1 of 3 - 19+ 3-dose series) Never done       Next Steps:   Schedule a nurse only visit for BP check    Type of outreach:    Sent [employer] message.      Questions for provider review:    None           Zuleika Rachel, CMA

## 2024-08-20 ENCOUNTER — ALLIED HEALTH/NURSE VISIT (OUTPATIENT)
Dept: FAMILY MEDICINE | Facility: CLINIC | Age: 54
End: 2024-08-20
Payer: COMMERCIAL

## 2024-08-20 VITALS
WEIGHT: 293 LBS | SYSTOLIC BLOOD PRESSURE: 118 MMHG | BODY MASS INDEX: 50.01 KG/M2 | DIASTOLIC BLOOD PRESSURE: 73 MMHG | HEART RATE: 63 BPM

## 2024-08-20 DIAGNOSIS — I10 PRIMARY HYPERTENSION: Primary | ICD-10-CM

## 2024-08-20 PROCEDURE — 99207 PR NO CHARGE NURSE ONLY: CPT

## 2024-08-20 NOTE — PROGRESS NOTES
Shital Krueger is a 54 year old patient who comes in today for a Blood Pressure check.  Initial BP:  /73 (BP Location: Left arm, Patient Position: Sitting, Cuff Size: Adult Large)   Pulse 63   Wt 135.3 kg (298 lb 3.2 oz)   BMI 50.01 kg/m       Data Unavailable  Disposition: follow-up as previously indicated by provider  Raina Rangel MA

## 2024-09-07 ENCOUNTER — MYC MEDICAL ADVICE (OUTPATIENT)
Dept: FAMILY MEDICINE | Facility: CLINIC | Age: 54
End: 2024-09-07
Payer: COMMERCIAL

## 2024-09-09 ENCOUNTER — NURSE TRIAGE (OUTPATIENT)
Dept: FAMILY MEDICINE | Facility: CLINIC | Age: 54
End: 2024-09-09
Payer: COMMERCIAL

## 2024-09-09 DIAGNOSIS — U07.1 INFECTION DUE TO 2019 NOVEL CORONAVIRUS: Primary | ICD-10-CM

## 2024-09-25 ENCOUNTER — MYC REFILL (OUTPATIENT)
Dept: FAMILY MEDICINE | Facility: CLINIC | Age: 54
End: 2024-09-25
Payer: COMMERCIAL

## 2024-09-25 RX ORDER — CELECOXIB 200 MG/1
200 CAPSULE ORAL
Status: CANCELLED | OUTPATIENT
Start: 2024-09-25

## 2024-09-25 NOTE — TELEPHONE ENCOUNTER
Clinic RN: Please contact patient because the medication is listed as historical or discontinued. Confirm patient is taking this medication. Document findings and route refill encounter to provider for approval or denial.  Bharti Clinton RN, BSN  [facility]

## 2024-10-14 ENCOUNTER — ALLIED HEALTH/NURSE VISIT (OUTPATIENT)
Dept: FAMILY MEDICINE | Facility: CLINIC | Age: 54
End: 2024-10-14
Payer: COMMERCIAL

## 2024-10-14 VITALS
HEART RATE: 78 BPM | OXYGEN SATURATION: 95 % | BODY MASS INDEX: 49.89 KG/M2 | DIASTOLIC BLOOD PRESSURE: 78 MMHG | WEIGHT: 293 LBS | SYSTOLIC BLOOD PRESSURE: 128 MMHG

## 2024-10-14 DIAGNOSIS — I10 PRIMARY HYPERTENSION: Primary | ICD-10-CM

## 2024-10-14 PROCEDURE — 99207 PR NO CHARGE NURSE ONLY: CPT

## 2024-10-14 NOTE — PROGRESS NOTES
Shital Kureger is a 54 year old patient who comes in today for a Blood Pressure check.  Initial BP:  /78 (BP Location: Right arm, Patient Position: Sitting, Cuff Size: Adult Large)   Pulse 78   Wt 134.9 kg (297 lb 8 oz)   SpO2 95%   BMI 49.89 kg/m       78  Disposition: results routed to provider

## 2024-11-13 DIAGNOSIS — I10 PRIMARY HYPERTENSION: ICD-10-CM

## 2024-11-13 RX ORDER — LOSARTAN POTASSIUM 50 MG/1
50 TABLET ORAL DAILY
Qty: 90 TABLET | Refills: 1 | Status: SHIPPED | OUTPATIENT
Start: 2024-11-13

## 2024-11-14 ENCOUNTER — MYC REFILL (OUTPATIENT)
Dept: ENDOCRINOLOGY | Facility: CLINIC | Age: 54
End: 2024-11-14
Payer: COMMERCIAL

## 2024-11-14 DIAGNOSIS — E66.01 CLASS 3 SEVERE OBESITY WITH SERIOUS COMORBIDITY AND BODY MASS INDEX (BMI) OF 50.0 TO 59.9 IN ADULT, UNSPECIFIED OBESITY TYPE (H): ICD-10-CM

## 2024-11-14 DIAGNOSIS — E66.813 CLASS 3 SEVERE OBESITY WITH SERIOUS COMORBIDITY AND BODY MASS INDEX (BMI) OF 50.0 TO 59.9 IN ADULT, UNSPECIFIED OBESITY TYPE (H): ICD-10-CM

## 2024-11-14 DIAGNOSIS — E88.810 METABOLIC SYNDROME: ICD-10-CM

## 2024-11-14 DIAGNOSIS — G47.19 EXCESSIVE DAYTIME SLEEPINESS: ICD-10-CM

## 2024-11-20 RX ORDER — SEMAGLUTIDE 1 MG/.5ML
1 INJECTION, SOLUTION SUBCUTANEOUS
Qty: 2 ML | Refills: 1 | Status: SHIPPED | OUTPATIENT
Start: 2024-11-20

## 2024-12-04 ENCOUNTER — MYC MEDICAL ADVICE (OUTPATIENT)
Dept: ENDOCRINOLOGY | Facility: CLINIC | Age: 54
End: 2024-12-04
Payer: COMMERCIAL

## 2024-12-04 ENCOUNTER — TELEPHONE (OUTPATIENT)
Dept: ENDOCRINOLOGY | Facility: CLINIC | Age: 54
End: 2024-12-04
Payer: COMMERCIAL

## 2024-12-04 DIAGNOSIS — E66.01 CLASS 3 SEVERE OBESITY WITH SERIOUS COMORBIDITY AND BODY MASS INDEX (BMI) OF 50.0 TO 59.9 IN ADULT, UNSPECIFIED OBESITY TYPE (H): Primary | ICD-10-CM

## 2024-12-04 DIAGNOSIS — E88.810 METABOLIC SYNDROME: ICD-10-CM

## 2024-12-04 DIAGNOSIS — E66.813 CLASS 3 SEVERE OBESITY WITH SERIOUS COMORBIDITY AND BODY MASS INDEX (BMI) OF 50.0 TO 59.9 IN ADULT, UNSPECIFIED OBESITY TYPE (H): Primary | ICD-10-CM

## 2024-12-04 RX ORDER — SEMAGLUTIDE 0.5 MG/.5ML
0.5 INJECTION, SOLUTION SUBCUTANEOUS
Qty: 2 ML | Refills: 1 | Status: SHIPPED | OUTPATIENT
Start: 2024-12-04

## 2024-12-04 NOTE — TELEPHONE ENCOUNTER
PA Initiation    Medication: ZEPBOUND 2.5 MG/0.5ML SC SOAJ  Insurance Company: [employer] Non-Specialty PA's - Phone [phone] Fax [phone]  Pharmacy Filling the Rx: [id number] - [place], [place] - [address]  Filling Pharmacy Phone: [phone]  Filling Pharmacy Fax: [phone]  Start Date: 12/4/2024    Key: [id number]

## 2024-12-06 NOTE — TELEPHONE ENCOUNTER
Prior Authorization Approval    Medication: ZEPBOUND 2.5 MG/0.5ML SC SOAJ  Authorization Effective Date: 12/4/2024  Authorization Expiration Date: 8/4/2025  Approved Dose/Quantity: 2ml per 28 days  Reference #: Key: [id number]   Insurance Company: [employer] Non-Specialty PA's - Phone [phone] Fax [phone]  Expected CoPay: $ 30  CoPay Card Available: No    Financial Assistance Needed: no  Which Pharmacy is filling the prescription: [id number] - [place], [place] - [address]  Pharmacy Notified: yes  Patient Notified: yes

## 2025-01-10 ENCOUNTER — VIRTUAL VISIT (OUTPATIENT)
Dept: ENDOCRINOLOGY | Facility: CLINIC | Age: 55
End: 2025-01-10
Payer: COMMERCIAL

## 2025-01-10 VITALS — HEIGHT: 65 IN | BODY MASS INDEX: 48.82 KG/M2 | WEIGHT: 293 LBS

## 2025-01-10 DIAGNOSIS — E66.01 CLASS 3 SEVERE OBESITY WITH SERIOUS COMORBIDITY AND BODY MASS INDEX (BMI) OF 50.0 TO 59.9 IN ADULT, UNSPECIFIED OBESITY TYPE (H): Primary | ICD-10-CM

## 2025-01-10 DIAGNOSIS — E66.813 CLASS 3 SEVERE OBESITY WITH SERIOUS COMORBIDITY AND BODY MASS INDEX (BMI) OF 50.0 TO 59.9 IN ADULT, UNSPECIFIED OBESITY TYPE (H): Primary | ICD-10-CM

## 2025-01-10 DIAGNOSIS — E88.810 METABOLIC SYNDROME: ICD-10-CM

## 2025-01-10 DIAGNOSIS — I10 PRIMARY HYPERTENSION: ICD-10-CM

## 2025-01-10 PROCEDURE — 98006 SYNCH AUDIO-VIDEO EST MOD 30: CPT | Performed by: NURSE PRACTITIONER

## 2025-01-10 ASSESSMENT — PAIN SCALES - GENERAL: PAINLEVEL_OUTOF10: MILD PAIN (3)

## 2025-01-10 NOTE — NURSING NOTE
Current patient location: [address] [facility] [address]    Is the patient currently in the state of [place]? YES    Visit mode: VIDEO    If the visit is dropped, the patient can be reconnected by:VIDEO VISIT: Text to cell phone:   Telephone Information:   Mobile [phone]       Will anyone else be joining the visit? NO  (If patient encounters technical issues they should call [phone]    Are changes needed to the allergy or medication list? Yes Please remove Wegovy.    Are refills needed on medications prescribed by this physician? Discuss with provider    Rooming Documentation:  Questionnaire(s) completed    Reason for visit: RECHECK    Pt states 3/10 side/mouth pain due to some oral surgery--seen for this already.    Per patient/provider no height/weight requested     Onofre MENDOZA
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
## 2025-01-10 NOTE — ASSESSMENT & PLAN NOTE
Availability of medication has made continuous use challenging. We switched to zepbound to improve continuity but still ran into difficulty with supply. Now, taking zepbond 2.5mg and plan to taper up as tolerated. Notes some bloating with both wegovy and zepbound, discussed strategies to better manage side effects.     Worse food noise since switching to zepbound. Had been at 2.4mg of wegovy and off x 1 month prior to switching to low dose zepbound. Suspect food noise will improve as we get to higher doses. Discussed the need to retaper.     Struggling with activity in the winter. Hard to go outside. Discussed strategies to be active inside     Doing well with working on improving protein, hydration, fruit/ and veg.     Continue 2.5mg x 2 weeks then increase to 5mg   Send [employer] message in about 6 weeks if needing to go up to 7.5mg  Send me a message if you get a weight check done  Select that you don't want to know BMI in [employer]   Continue to prioritize protein   Look into [employer] OR chair yoga to move your body without stressing your knees   Continue to work  on increasing hydration   Update labs when able   Follow up with me in 3 months

## 2025-01-10 NOTE — PATIENT INSTRUCTIONS
Continue 2.5mg x 2 weeks then increase to 5mg   Send [employer] message in about 6 weeks if needing to go up to 7.5mg  Send me a message if you get a weight check done  Select that you don't want to know BMI in Mychart   Continue to prioritize protein   Look into [employer] OR chair yoga to move your body without stressing your knees   Continue to work  on increasing hydration   Update labs when able   Follow up with me in 3 months

## 2025-01-10 NOTE — PROGRESS NOTES
Virtual Visit Details    Type of service:  Video Visit     Originating Location (pt. Location): Home    Distant Location (provider location):  Off-site  Platform used for Video Visit: Jones

## 2025-01-27 ENCOUNTER — OFFICE VISIT (OUTPATIENT)
Dept: FAMILY MEDICINE | Facility: CLINIC | Age: 55
End: 2025-01-27
Payer: COMMERCIAL

## 2025-01-27 VITALS
WEIGHT: 293 LBS | TEMPERATURE: 97.7 F | HEART RATE: 80 BPM | RESPIRATION RATE: 97 BRPM | BODY MASS INDEX: 48.82 KG/M2 | SYSTOLIC BLOOD PRESSURE: 124 MMHG | HEIGHT: 65 IN | DIASTOLIC BLOOD PRESSURE: 76 MMHG

## 2025-01-27 DIAGNOSIS — E88.810 METABOLIC SYNDROME: ICD-10-CM

## 2025-01-27 DIAGNOSIS — R14.0 ABDOMINAL BLOATING: Primary | ICD-10-CM

## 2025-01-27 DIAGNOSIS — E66.813 CLASS 3 SEVERE OBESITY WITH SERIOUS COMORBIDITY AND BODY MASS INDEX (BMI) OF 50.0 TO 59.9 IN ADULT, UNSPECIFIED OBESITY TYPE (H): ICD-10-CM

## 2025-01-27 DIAGNOSIS — E66.01 CLASS 3 SEVERE OBESITY WITH SERIOUS COMORBIDITY AND BODY MASS INDEX (BMI) OF 50.0 TO 59.9 IN ADULT, UNSPECIFIED OBESITY TYPE (H): ICD-10-CM

## 2025-01-27 DIAGNOSIS — I10 PRIMARY HYPERTENSION: ICD-10-CM

## 2025-01-27 DIAGNOSIS — R10.9 FLANK PAIN: ICD-10-CM

## 2025-01-27 DIAGNOSIS — L71.9 ROSACEA: ICD-10-CM

## 2025-01-27 DIAGNOSIS — B37.2 YEAST DERMATITIS: ICD-10-CM

## 2025-01-27 LAB
ALBUMIN SERPL BCG-MCNC: 4.3 G/DL (ref 3.5–5.2)
ALBUMIN UR-MCNC: NEGATIVE MG/DL
ALP SERPL-CCNC: 88 U/L (ref 40–150)
ALT SERPL W P-5'-P-CCNC: 21 U/L (ref 0–50)
ANION GAP SERPL CALCULATED.3IONS-SCNC: 11 MMOL/L (ref 7–15)
APPEARANCE UR: CLEAR
AST SERPL W P-5'-P-CCNC: 26 U/L (ref 0–45)
BACTERIA #/AREA URNS HPF: ABNORMAL /HPF
BILIRUB SERPL-MCNC: 0.9 MG/DL
BILIRUB UR QL STRIP: NEGATIVE
BUN SERPL-MCNC: 9.1 MG/DL (ref 6–20)
CALCIUM SERPL-MCNC: 8.9 MG/DL (ref 8.8–10.4)
CHLORIDE SERPL-SCNC: 105 MMOL/L (ref 98–107)
CHOLEST SERPL-MCNC: 153 MG/DL
COLOR UR AUTO: YELLOW
CREAT SERPL-MCNC: 0.78 MG/DL (ref 0.51–0.95)
EGFRCR SERPLBLD CKD-EPI 2021: 90 ML/MIN/1.73M2
ERYTHROCYTE [DISTWIDTH] IN BLOOD BY AUTOMATED COUNT: 12.1 % (ref 10–15)
EST. AVERAGE GLUCOSE BLD GHB EST-MCNC: 103 MG/DL
FASTING STATUS PATIENT QL REPORTED: NO
FASTING STATUS PATIENT QL REPORTED: NO
GLUCOSE SERPL-MCNC: 89 MG/DL (ref 70–99)
GLUCOSE UR STRIP-MCNC: NEGATIVE MG/DL
HBA1C MFR BLD: 5.2 % (ref 0–5.6)
HCG UR QL: NEGATIVE
HCO3 SERPL-SCNC: 25 MMOL/L (ref 22–29)
HCT VFR BLD AUTO: 37.4 % (ref 35–47)
HDLC SERPL-MCNC: 49 MG/DL
HGB BLD-MCNC: 13.1 G/DL (ref 11.7–15.7)
HGB UR QL STRIP: ABNORMAL
KETONES UR STRIP-MCNC: NEGATIVE MG/DL
LDLC SERPL CALC-MCNC: 88 MG/DL
LEUKOCYTE ESTERASE UR QL STRIP: NEGATIVE
MCH RBC QN AUTO: 34.3 PG (ref 26.5–33)
MCHC RBC AUTO-ENTMCNC: 35 G/DL (ref 31.5–36.5)
MCV RBC AUTO: 98 FL (ref 78–100)
NITRATE UR QL: NEGATIVE
NONHDLC SERPL-MCNC: 104 MG/DL
PH UR STRIP: 5.5 [PH] (ref 5–7)
PLATELET # BLD AUTO: 236 10E3/UL (ref 150–450)
POTASSIUM SERPL-SCNC: 3.9 MMOL/L (ref 3.4–5.3)
PROT SERPL-MCNC: 7.3 G/DL (ref 6.4–8.3)
RBC # BLD AUTO: 3.82 10E6/UL (ref 3.8–5.2)
RBC #/AREA URNS AUTO: ABNORMAL /HPF
SODIUM SERPL-SCNC: 141 MMOL/L (ref 135–145)
SP GR UR STRIP: 1.02 (ref 1–1.03)
SQUAMOUS #/AREA URNS AUTO: ABNORMAL /LPF
TRIGL SERPL-MCNC: 82 MG/DL
UROBILINOGEN UR STRIP-ACNC: 0.2 E.U./DL
WBC # BLD AUTO: 7.4 10E3/UL (ref 4–11)
WBC #/AREA URNS AUTO: ABNORMAL /HPF

## 2025-01-27 PROCEDURE — 85027 COMPLETE CBC AUTOMATED: CPT

## 2025-01-27 PROCEDURE — 83036 HEMOGLOBIN GLYCOSYLATED A1C: CPT

## 2025-01-27 PROCEDURE — 36415 COLL VENOUS BLD VENIPUNCTURE: CPT

## 2025-01-27 PROCEDURE — 80053 COMPREHEN METABOLIC PANEL: CPT

## 2025-01-27 PROCEDURE — 99214 OFFICE O/P EST MOD 30 MIN: CPT

## 2025-01-27 PROCEDURE — 80061 LIPID PANEL: CPT

## 2025-01-27 PROCEDURE — 81001 URINALYSIS AUTO W/SCOPE: CPT

## 2025-01-27 PROCEDURE — 81025 URINE PREGNANCY TEST: CPT

## 2025-01-27 PROCEDURE — G2211 COMPLEX E/M VISIT ADD ON: HCPCS

## 2025-01-27 RX ORDER — HYDROCHLOROTHIAZIDE 25 MG/1
25 TABLET ORAL DAILY PRN
Qty: 90 TABLET | Refills: 2 | Status: SHIPPED | OUTPATIENT
Start: 2025-01-27

## 2025-01-27 RX ORDER — NYSTATIN 100000 [USP'U]/G
POWDER TOPICAL 2 TIMES DAILY PRN
Qty: 60 G | Refills: 0 | Status: SHIPPED | OUTPATIENT
Start: 2025-01-27

## 2025-01-27 RX ORDER — LOSARTAN POTASSIUM 50 MG/1
50 TABLET ORAL DAILY
Qty: 90 TABLET | Refills: 3 | Status: SHIPPED | OUTPATIENT
Start: 2025-01-27

## 2025-01-27 NOTE — PROGRESS NOTES
"  Assessment & Plan     (R14.0) Abdominal bloating  (primary encounter diagnosis)  (R10.9) Flank pain  Comment: Undiagnosed medical condition with further work-up planned. No alarm signs/symptoms. Patient very concerned for cancer, so will order CT of abdomen pelvis to rule out. Patient does have a long history of bloating and likely has IBS; low fodmap handout given. Patient also has a history of thoracic back pain, which could be the source of her flank pain. If CT scan is normal, patient opted to discuss with her PCP at upcoming wellness visit. .  Plan: CT Abdomen Pelvis w Contrast, HCG qualitative         urine, Comprehensive metabolic panel (BMP +         Alb, Alk Phos, ALT, AST, Total. Bili, TP), CBC         with platelets, UA Macroscopic with reflex to         Microscopic and Culture    (L71.9) Rosacea  Comment: Chronic stable medical condition; continue present management on current treatment regimen; refilled.   Plan: metroNIDAZOLE (METROCREAM) 0.75 % external         cream    (I10) Primary hypertension  Comment: Chronic stable medical condition; continue present management on current treatment regimen; refilled meds.   Plan: losartan (COZAAR) 50 MG tablet,         hydrochlorothiazide (HYDRODIURIL) 25 MG tablet    (B37.2) Yeast dermatitis  Comment: Chronic recurrent medical condition. Refilled nystatin.  Plan: nystatin (MYCOSTATIN) 233878 UNIT/GM external         powder    (E66.813,  E66.01,  Z68.43) Class 3 severe obesity with serious comorbidity and body mass index (BMI) of 50.0 to 59.9 in adult, unspecified obesity type (H)  (E88.810) Metabolic syndrome  Comment: Active medical condition, currently on Zepbound; managed by bariatric surgery.        BMI  Estimated body mass index is 50.85 kg/m  as calculated from the following:    Height as of this encounter: 1.645 m (5' 4.75\").    Weight as of this encounter: 137.5 kg (303 lb 3.2 oz).       The longitudinal plan of care for the diagnosis(es)/condition(s) " "as documented were addressed during this visit. Due to the added complexity in care, I will continue to support Agatha in the subsequent management and with ongoing continuity of care.    Subjective   Agatha is a 54 year old, presenting for the following health issues:  Musculoskeletal Problem        1/27/2025    12:53 PM   Additional Questions   Roomed by Za Fountain     History of Present Illness       Reason for visit:  Pain in my side  Symptom onset:  More than a month  Symptoms include:  Achy, almost a crawling feeling in my right side  Symptom intensity:  Mild  Symptom progression:  Staying the same  Had these symptoms before:  Yes  Has tried/received treatment for these symptoms:  No   She is taking medications regularly.     Right side/flank pain - achy, intermittent over the past year, abdominal bloating. Denies SOB, significant changes in bowels.     Pain History:  Have you seen this provider for your pain in the past? No   Where in your body do your have pain? Right side flank and ribcage  Are you seeing anyone else for your pain? No  What makes your pain better? Ibuprofen and heating  What makes your pain worse? Leaning to the right and its intermittent.   How has pain affected your ability to work? 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 , data entey  Who lives in your household?  and daughter        12/29/2017    10:37 AM 2/15/2022     8:43 AM 6/25/2024    12:51 PM   PHQ-9 SCORE   PHQ-9 Total Score MyChart  2 (Minimal depression) 3 (Minimal depression)   PHQ-9 Total Score 2 2 3                 1/27/2025     1:02 PM   PEG Score   PEG Total Score 3               Objective    /76 (BP Location: Right arm, Patient Position: Sitting, Cuff Size: Adult Large)   Pulse 80   Temp 97.7  F (36.5  C) (Oral)   Resp (!) 97   Ht 1.645 m (5' 4.75\")   Wt (!) 137.5 kg (303 lb 3.2 oz)   HC 19 cm (7.48\")   BMI 50.85 kg/m    Body mass index is 50.85 " kg/m .  Physical Exam               Signed Electronically by: JARAD Jaffe CNP

## 2025-02-09 ENCOUNTER — HOSPITAL ENCOUNTER (OUTPATIENT)
Dept: CT IMAGING | Facility: CLINIC | Age: 55
Discharge: HOME | End: 2025-02-09
Payer: COMMERCIAL

## 2025-02-09 DIAGNOSIS — R14.0 ABDOMINAL BLOATING: ICD-10-CM

## 2025-02-09 DIAGNOSIS — R10.9 FLANK PAIN: ICD-10-CM

## 2025-02-09 PROCEDURE — 74177 CT ABD & PELVIS W/CONTRAST: CPT

## 2025-02-09 PROCEDURE — 250N000011 HC RX IP 250 OP 636

## 2025-02-09 RX ORDER — IOPAMIDOL 755 MG/ML
500 INJECTION, SOLUTION INTRAVASCULAR ONCE
Status: COMPLETED | OUTPATIENT
Start: 2025-02-09 | End: 2025-02-09

## 2025-02-09 RX ADMIN — IOPAMIDOL 100 ML: 755 INJECTION, SOLUTION INTRAVENOUS at 14:49

## 2025-02-12 ENCOUNTER — HOSPITAL ENCOUNTER (OUTPATIENT)
Dept: MAMMOGRAPHY | Facility: CLINIC | Age: 55
Discharge: HOME | End: 2025-02-12
Payer: COMMERCIAL

## 2025-02-12 DIAGNOSIS — Z12.31 VISIT FOR SCREENING MAMMOGRAM: ICD-10-CM

## 2025-02-12 PROCEDURE — 77067 SCR MAMMO BI INCL CAD: CPT

## 2025-02-12 PROCEDURE — 77063 BREAST TOMOSYNTHESIS BI: CPT

## 2025-03-31 SDOH — HEALTH STABILITY: PHYSICAL HEALTH: ON AVERAGE, HOW MANY MINUTES DO YOU ENGAGE IN EXERCISE AT THIS LEVEL?: 20 MIN

## 2025-03-31 SDOH — HEALTH STABILITY: PHYSICAL HEALTH: ON AVERAGE, HOW MANY DAYS PER WEEK DO YOU ENGAGE IN MODERATE TO STRENUOUS EXERCISE (LIKE A BRISK WALK)?: 2 DAYS

## 2025-03-31 ASSESSMENT — SOCIAL DETERMINANTS OF HEALTH (SDOH): HOW OFTEN DO YOU GET TOGETHER WITH FRIENDS OR RELATIVES?: ONCE A WEEK

## 2025-04-02 ENCOUNTER — OFFICE VISIT (OUTPATIENT)
Dept: FAMILY MEDICINE | Facility: CLINIC | Age: 55
End: 2025-04-02
Payer: COMMERCIAL

## 2025-04-02 VITALS
DIASTOLIC BLOOD PRESSURE: 84 MMHG | SYSTOLIC BLOOD PRESSURE: 127 MMHG | OXYGEN SATURATION: 95 % | HEIGHT: 64 IN | TEMPERATURE: 98.5 F | RESPIRATION RATE: 20 BRPM | WEIGHT: 293 LBS | HEART RATE: 76 BPM | BODY MASS INDEX: 50.02 KG/M2

## 2025-04-02 DIAGNOSIS — L71.9 ROSACEA: ICD-10-CM

## 2025-04-02 DIAGNOSIS — M25.561 CHRONIC PAIN OF BOTH KNEES: ICD-10-CM

## 2025-04-02 DIAGNOSIS — M25.562 CHRONIC PAIN OF BOTH KNEES: ICD-10-CM

## 2025-04-02 DIAGNOSIS — M25.512 ACUTE PAIN OF LEFT SHOULDER: ICD-10-CM

## 2025-04-02 DIAGNOSIS — Z00.00 ROUTINE GENERAL MEDICAL EXAMINATION AT A HEALTH CARE FACILITY: Primary | ICD-10-CM

## 2025-04-02 DIAGNOSIS — R82.90 ABNORMAL URINE: ICD-10-CM

## 2025-04-02 DIAGNOSIS — R10.11 RUQ ABDOMINAL PAIN: ICD-10-CM

## 2025-04-02 DIAGNOSIS — B37.2 YEAST DERMATITIS: ICD-10-CM

## 2025-04-02 DIAGNOSIS — Z23 NEED FOR PNEUMOCOCCAL 20-VALENT CONJUGATE VACCINATION: ICD-10-CM

## 2025-04-02 DIAGNOSIS — I10 PRIMARY HYPERTENSION: ICD-10-CM

## 2025-04-02 DIAGNOSIS — G89.29 CHRONIC PAIN OF BOTH KNEES: ICD-10-CM

## 2025-04-02 LAB
ALBUMIN UR-MCNC: NEGATIVE MG/DL
APPEARANCE UR: CLEAR
BACTERIA #/AREA URNS HPF: ABNORMAL /HPF
BILIRUB UR QL STRIP: NEGATIVE
COLOR UR AUTO: YELLOW
GLUCOSE UR STRIP-MCNC: NEGATIVE MG/DL
HGB UR QL STRIP: ABNORMAL
KETONES UR STRIP-MCNC: NEGATIVE MG/DL
LEUKOCYTE ESTERASE UR QL STRIP: NEGATIVE
MUCOUS THREADS #/AREA URNS LPF: PRESENT /LPF
NITRATE UR QL: NEGATIVE
PH UR STRIP: 5.5 [PH] (ref 5–7)
RBC #/AREA URNS AUTO: ABNORMAL /HPF
SP GR UR STRIP: 1.02 (ref 1–1.03)
SQUAMOUS #/AREA URNS AUTO: ABNORMAL /LPF
UROBILINOGEN UR STRIP-ACNC: 0.2 E.U./DL
WBC #/AREA URNS AUTO: ABNORMAL /HPF

## 2025-04-02 PROCEDURE — 81001 URINALYSIS AUTO W/SCOPE: CPT

## 2025-04-02 RX ORDER — LOSARTAN POTASSIUM 50 MG/1
50 TABLET ORAL DAILY
Qty: 90 TABLET | Refills: 3 | Status: SHIPPED | OUTPATIENT
Start: 2025-04-02

## 2025-04-02 RX ORDER — HYDROCHLOROTHIAZIDE 25 MG/1
25 TABLET ORAL DAILY PRN
Qty: 90 TABLET | Refills: 3 | Status: SHIPPED | OUTPATIENT
Start: 2025-04-02

## 2025-04-02 RX ORDER — CELECOXIB 200 MG/1
200 CAPSULE ORAL DAILY
Qty: 90 CAPSULE | Refills: 2 | Status: SHIPPED | OUTPATIENT
Start: 2025-04-02

## 2025-04-02 RX ORDER — NYSTATIN 100000 [USP'U]/G
POWDER TOPICAL 2 TIMES DAILY PRN
Qty: 60 G | Refills: 0 | Status: SHIPPED | OUTPATIENT
Start: 2025-04-02

## 2025-04-02 NOTE — PROGRESS NOTES
Preventive Care Visit  [facility]  Kenia Hanks PA-C, Family Medicine  Apr 2, 2025    Assessment & Plan     (Z00.00) Routine general medical examination at a health care facility  (primary encounter diagnosis)  Stable exam. Routine screening labs were done in January, reviewed and stable. Follow up in 1 year for annual wellness; sooner as needed for acute concerns.  Plan: PRIMARY CARE FOLLOW-UP SCHEDULING          (I10) Primary hypertension  Well controlled with use of losartan and hydrochlorothiazide. Most recent kidney function was reviewed and stable. No new side effects of the medication. Refill provided. Will continue to monitor.    Plan: losartan (COZAAR) 50 MG tablet,         hydrochlorothiazide (HYDRODIURIL) 25 MG tablet          (B37.2) Yeast dermatitis  Well controlled with use of nystatin powder. Having this on her abdomen due to weight, continues to use powder for now but concern for possible worsening of symptoms with weight loss/excess skin. Will continue to monitor. No new side effects of the medication. Refill provided.  Plan: nystatin (MYCOSTATIN) 189763 UNIT/GM external         powder          (L71.9) Rosacea  Well controlled with use of metronidazole cream. No new side effects of the medication. Refill provided. Will continue to monitor.   Plan: metroNIDAZOLE (METROCREAM) 0.75 % external         cream          (R82.90) Abnormal urine  Having urinary frequency with occasional odor. UA obtained in clinic which does not shows signs of infection. Will continue to monitor symptoms for now. No culture indicated.   Plan: UA Macroscopic with reflex to Microscopic and         Culture - Lab Collect, UA Microscopic with         Reflex to Culture          (R10.11) RUQ abdominal pain  Ongoing intermittent RUQ abdominal pain although does seem to be slightly better. She had CT imaging in February which overall was reassuring. Could consider dedicated gall bladder ultrasound in the  "future if symptoms persisting. For now will monitor.      (M25.561,  M25.562,  G89.29) Chronic pain of both knees  Bilateral chronic knee pain. Would be interested in reassessment with [facility], referral placed today. Okay to continue with celebrex PRN, she is aware she should limit use overall and currently only using with moderate-severe pain. Will continue to follow.    Plan: celecoxib (CELEBREX) 200 MG capsule, Orthopedic         Referral          (M25.512) Acute pain of left shoulder  New left shoulder pain. Unsure if related to pressure from her purse strap or other etiology.  No known injury to the shoulder.  She has not had shoulder issues in the past.  She is currently working on weight loss with GLP-1 medication, diet and exercise.  She would be interested in orthopedic assessment for this as well.  Referral placed for shoulder pain follow-up.  Plan: Orthopedic  Referral          (Z23) Need for pneumococcal 20-valent conjugate vaccination  Plan: Pneumococcal 20 Valent Conjugate (PCV20)    The longitudinal plan of care for the diagnosis(es)/condition(s) as documented were addressed during this visit. Due to the added complexity in care, I will continue to support Agatha in the subsequent management and with ongoing continuity of care.          Patient has been advised of split billing requirements and indicates understanding: Yes    BMI  Estimated body mass index is 50.28 kg/m  as calculated from the following:    Height as of this encounter: 1.635 m (5' 4.37\").    Weight as of this encounter: 134.4 kg (296 lb 4.8 oz).   Weight management plan: Discussed healthy diet and exercise guidelines    Counseling  Appropriate preventive services were addressed with this patient via screening, questionnaire, or discussion as appropriate for fall prevention, nutrition, physical activity, Tobacco-use cessation, social engagement, weight loss and cognition.  Checklist reviewing preventive services available " has been given to the patient.  Reviewed patient's diet, addressing concerns and/or questions.   She is at risk for lack of exercise and has been provided with information to increase physical activity for the benefit of her well-being.       Follow up in 1 year for annual wellness visit; sooner with any acute concerns.    Peter Snider is a 54 year old, presenting for the following:  Physical (Fasting) and Lab Only (Request UA to rule out any bladder or urinary infection)        4/2/2025     6:53 AM   Additional Questions   Roomed by Cydney   Accompanied by Self          HPI    -Was seen in January and has routine labs done at that time which were stable.     -Had recent CT scan due to RUQ abdominal discomfort in February which showed bladder wall thickening, non specific. She has been sleeping in the recliner more often and does feel this could be contributing to RUQ abdominal pain. Is getting up at night to pee 1-2 times a night.     -Has been on the injectable since July/August. Current dose of Mounjaro is 7.5 mg. She has a visit with Endocrinology who manages her medication in mid-April. Working on diet as well.     -September 2024 had covid infection.     Advance Care Planning  Patient does not have a Health Care Directive: Discussed advance care planning with patient; however, patient declined at this time.      3/31/2025   General Health   How would you rate your overall physical health? (!) FAIR   Feel stress (tense, anxious, or unable to sleep) Only a little   (!) STRESS CONCERN        3/31/2025   Nutrition   Three or more servings of calcium each day? (!) I DON'T KNOW   Diet: Other   If other, please elaborate: I'm trying to eat a healthy protein filled diet.   How many servings of fruit and vegetables per day? (!) 2-3   How many sweetened beverages each day? 0-1         3/31/2025   Exercise   Days per week of moderate/strenous exercise 2 days   Average minutes spent exercising at this level 20 min    (!) EXERCISE CONCERN        3/31/2025   Social Factors   Frequency of gathering with friends or relatives Once a week   Worry food won't last until get money to buy more No   Food not last or not have enough money for food? No   Do you have housing? (Housing is defined as stable permanent housing and does not include staying ouside in a car, in a tent, in an abandoned building, in an overnight shelter, or couch-surfing.) Yes   Are you worried about losing your housing? No   Lack of transportation? No   Unable to get utilities (heat,electricity)? No         4/2/2025   Fall Risk   Gait Speed Test (Document in seconds) 4.22   Gait Speed Test Interpretation Less than or equal to 5.00 seconds - PASS          3/31/2025   Dental   Dentist two times every year? Yes         3/13/2024   TB Screening   Were you born outside of the [place]? No     Today's PHQ-2 Score:       1/10/2025    12:10 PM   PHQ-2 ( 1999 [employer])   Q1: Little interest or pleasure in doing things 0   Q2: Feeling down, depressed or hopeless 0   PHQ-2 Score 0    Q1: Little interest or pleasure in doing things Not at all   Q2: Feeling down, depressed or hopeless Not at all   PHQ-2 Score 0       Patient-reported         3/31/2025   Substance Use   If I could quit smoking, I would Completely agree   I want to quit somking, worry about health affects Completely agree   Willing to make a plan to quit smoking Completely agree   Willing to cut down before quitting Completely agree   Alcohol more than 3/day or more than 7/wk No   Do you use any other substances recreationally? No     Social History     Tobacco Use    Smoking status: Former     Types: Cigarettes    Smokeless tobacco: Never   Vaping Use    Vaping status: Never Used   Substance Use Topics    Alcohol use: Not Currently     Comment: occasional    Drug use: No         2/12/2025   LAST FHS-7 RESULTS   1st degree relative breast or ovarian cancer No   Any relative bilateral breast cancer No   Any male have  breast cancer No   Any ONE woman have BOTH breast AND ovarian cancer No   Any woman with breast cancer before 50yrs No   2 or more relatives with breast AND/OR ovarian cancer Yes   2 or more relatives with breast AND/OR bowel cancer Yes     Mammogram Screening - Mammogram every 1-2 years updated in Health Maintenance based on mutual decision making        3/31/2025   STI Screening   New sexual partner(s) since last STI/HIV test? No     History of abnormal Pap smear: No - age 30- 64 PAP with HPV every 5 years recommended      Latest Ref Rng & Units 3/20/2024     9:03 AM 12/3/2019     1:27 PM 12/3/2019     1:20 PM   PAP / HPV   PAP  Negative for Intraepithelial Lesion or Malignancy (NILM)      PAP (Historical)   NIL     HPV 16 DNA Negative Negative   Negative    HPV 18 DNA Negative Negative   Negative    Other HR HPV Negative Negative   Negative      ASCVD Risk   The 10-year ASCVD risk score (Nigel RAO, et al., 2019) is: 1.8%    Values used to calculate the score:      Age: 54 years      Sex: Female      Is Non- : No      Diabetic: No      Tobacco smoker: No      Systolic Blood Pressure: 127 mmHg      Is BP treated: Yes      HDL Cholesterol: 49 mg/dL      Total Cholesterol: 153 mg/dL    Reviewed and updated as needed this visit by Provider   Tobacco  Allergies  Meds  Problems  Med Hx  Surg Hx  Fam Hx            History reviewed. No pertinent past medical history.  Past Surgical History:   Procedure Laterality Date     SECTION      COLONOSCOPY N/A 2024    Procedure: Colonoscopy with polypectomies using cold snare and one hemoclip applied for preventative bleeding;  Surgeon: Marvel Mendez MD;  Location:  GI     BP Readings from Last 3 Encounters:   25 127/84   25 124/76   10/14/24 128/78    Wt Readings from Last 3 Encounters:   25 134.4 kg (296 lb 4.8 oz)   25 (!) 137.5 kg (303 lb 3.2 oz)   01/10/25 134.7 kg (297 lb)                   Patient Active Problem List   Diagnosis    Class 3 severe obesity with serious comorbidity and body mass index (BMI) of 50.0 to 59.9 in adult, unspecified obesity type (H)    HTN (hypertension)    Rosacea    Yeast dermatitis    Metabolic syndrome     Past Surgical History:   Procedure Laterality Date     SECTION      COLONOSCOPY N/A 2024    Procedure: Colonoscopy with polypectomies using cold snare and one hemoclip applied for preventative bleeding;  Surgeon: Marvel Mendez MD;  Location:  GI       Social History     Tobacco Use    Smoking status: Former     Types: Cigarettes    Smokeless tobacco: Never   Substance Use Topics    Alcohol use: Not Currently     Comment: occasional     Family History   Problem Relation Age of Onset    Colon Cancer Mother 81    Glaucoma Mother     Thyroid Disease Mother     Hyperlipidemia Mother     Hypertension Mother     Heart Failure Father     Hyperlipidemia Father     Hypertension Father     Prostate Cancer Father     Cerebrovascular Disease Maternal Grandmother     Hypertension Paternal Half-Brother     Prostate Cancer Paternal Half-Brother     Hypertension Paternal Half-Brother     Other Cancer Paternal Half-Brother         non-hodgkins lymphoma    Hypertension Paternal Half-Sister     Hypertension Paternal Half-Sister     Cerebrovascular Disease Cousin         51    Cerebrovascular Disease Cousin         58    Other Cancer Maternal Cousin         ovarian    Ovarian Cancer Maternal Cousin     Breast Cancer Maternal Cousin         after 50    Breast Cancer Maternal Cousin         after 50    Prostate Cancer Other         uncle    Other Cancer Other         liver cancer-uncle    Other Cancer Other         multiple myeloma-uncle    Other Cancer Other         lung cancer-uncle         Current Outpatient Medications   Medication Sig Dispense Refill    hydrochlorothiazide (HYDRODIURIL) 25 MG tablet Take 1 tablet (25 mg) by mouth daily as needed (edema). 90 tablet 2  "   losartan (COZAAR) 50 MG tablet Take 1 tablet (50 mg) by mouth daily. 90 tablet 3    metroNIDAZOLE (METROCREAM) 0.75 % external cream Apply topically 2 times daily. Apply to face for rosacea 45 g 1    nystatin (MYCOSTATIN) 599990 UNIT/GM external powder Apply topically 2 times daily as needed for other (for rash to breast). Apply to crease of breast two times per day x 7-10 as needed for rash 60 g 0    tirzepatide-Weight Management (ZEPBOUND) 7.5 MG/0.5ML prefilled pen Inject 0.5 mLs (7.5 mg) subcutaneously every 7 days. 2 mL 2    celecoxib (CELEBREX) 200 MG capsule 200 mg      clobetasol (TEMOVATE) 0.05 % CREA cream       loratadine (CLARITIN) 10 MG tablet Take 10 mg by mouth daily      tirzepatide-Weight Management (ZEPBOUND) 5 MG/0.5ML prefilled pen Inject 0.5 mLs (5 mg) subcutaneously every 7 days. 2 mL 2     Allergies   Allergen Reactions    Penicillins Unknown         Review of Systems  Constitutional, HEENT, cardiovascular, pulmonary, GI, , musculoskeletal, neuro, skin, endocrine and psych systems are negative, except as otherwise noted.       Objective    Exam  /84 (BP Location: Right arm, Patient Position: Sitting, Cuff Size: Adult Large)   Pulse 76   Temp 98.5  F (36.9  C) (Oral)   Resp 20   Ht 1.635 m (5' 4.37\")   Wt 134.4 kg (296 lb 4.8 oz)   SpO2 95%   BMI 50.28 kg/m     Estimated body mass index is 50.28 kg/m  as calculated from the following:    Height as of this encounter: 1.635 m (5' 4.37\").    Weight as of this encounter: 134.4 kg (296 lb 4.8 oz).    Physical Exam  GENERAL: alert and no distress  EYES: Eyes grossly normal to inspection, PERRL and conjunctivae and sclerae normal  HENT: ear canals and TM's normal, nose and mouth without ulcers or lesions  NECK: no adenopathy, no asymmetry, masses, or scars  RESP: lungs clear to auscultation - no rales, rhonchi or wheezes  CV: regular rate and rhythm, normal S1 S2, no S3 or S4, no murmur, click or rub, no peripheral edema  MS: no " gross musculoskeletal defects noted, no edema  SKIN: no suspicious lesions or rashes  NEURO: Normal strength and tone, mentation intact and speech normal  PSYCH: mentation appears normal, affect normal/bright        Signed Electronically by: Kenia Hanks PA-C

## 2025-04-02 NOTE — PATIENT INSTRUCTIONS
Patient Education   Preventive Care Advice   This is general advice given by our system to help you stay healthy. However, your care team may have specific advice just for you. Please talk to your care team about your preventive care needs.  Nutrition  Eat 5 or more servings of fruits and vegetables each day.  Try wheat bread, brown rice and whole grain pasta (instead of white bread, rice, and pasta).  Get enough calcium and vitamin D. Check the label on foods and aim for 100% of the RDA (recommended daily allowance).  Lifestyle  Exercise at least 150 minutes each week  (30 minutes a day, 5 days a week).  Do muscle strengthening activities 2 days a week. These help control your weight and prevent disease.  No smoking.  Wear sunscreen to prevent skin cancer.  Have a dental exam and cleaning every 6 months.  Yearly exams  See your health care team every year to talk about:  Any changes in your health.  Any medicines your care team has prescribed.  Preventive care, family planning, and ways to prevent chronic diseases.  Shots (vaccines)   HPV shots (up to age 26), if you've never had them before.  Hepatitis B shots (up to age 59), if you've never had them before.  COVID-19 shot: Get this shot when it's due.  Flu shot: Get a flu shot every year.  Tetanus shot: Get a tetanus shot every 10 years.  Pneumococcal, hepatitis A, and RSV shots: Ask your care team if you need these based on your risk.  Shingles shot (for age 50 and up)  General health tests  Diabetes screening:  Starting at age 35, Get screened for diabetes at least every 3 years.  If you are younger than age 35, ask your care team if you should be screened for diabetes.  Cholesterol test: At age 39, start having a cholesterol test every 5 years, or more often if advised.  Bone density scan (DEXA): At age 50, ask your care team if you should have this scan for osteoporosis (brittle bones).  Hepatitis C: Get tested at least once in your life.  STIs (sexually  transmitted infections)  Before age 24: Ask your care team if you should be screened for STIs.  After age 24: Get screened for STIs if you're at risk. You are at risk for STIs (including HIV) if:  You are sexually active with more than one person.  You don't use condoms every time.  You or a partner was diagnosed with a sexually transmitted infection.  If you are at risk for HIV, ask about PrEP medicine to prevent HIV.  Get tested for HIV at least once in your life, whether you are at risk for HIV or not.  Cancer screening tests  Cervical cancer screening: If you have a cervix, begin getting regular cervical cancer screening tests starting at age 21.  Breast cancer scan (mammogram): If you've ever had breasts, begin having regular mammograms starting at age 40. This is a scan to check for breast cancer.  Colon cancer screening: It is important to start screening for colon cancer at age 45.  Have a colonoscopy test every 10 years (or more often if you're at risk) Or, ask your provider about stool tests like a FIT test every year or Cologuard test every 3 years.  To learn more about your testing options, visit:   .  For help making a decision, visit:   https://bit.ly/bt31237.  Prostate cancer screening test: If you have a prostate, ask your care team if a prostate cancer screening test (PSA) at age 55 is right for you.  Lung cancer screening: If you are a current or former smoker ages 50 to 80, ask your care team if ongoing lung cancer screenings are right for you.  For informational purposes only. Not to replace the advice of your health care provider. Copyright   2023 [facility]. All rights reserved. Clinically reviewed by the [facility]. [employer] [id number] - REV 01/24.  Preventing Falls: Care Instructions  Injuries and health problems such as trouble walking or poor eyesight can increase your risk of falling. So can some medicines. But there are things you can do to help  "prevent falls. You can exercise to get stronger. You can also arrange your home to make it safer.    Talk to your doctor about the medicines you take. Ask if any of them increase the risk of falls and whether they can be changed or stopped.   Try to exercise regularly. It can help improve your strength and balance. This can help lower your risk of falling.         Practice fall safety and prevention.   Wear low-heeled shoes that fit well and give your feet good support. Talk to your doctor if you have foot problems that make this hard.  Carry a cellphone or wear a medical alert device that you can use to call for help.  Use stepladders instead of chairs to reach high objects. Don't climb if you're at risk for falls. Ask for help, if needed.  Wear the correct eyeglasses, if you need them.        Make your home safer.   Remove rugs, cords, clutter, and furniture from walkways.  Keep your house well lit. Use night-lights in hallways and bathrooms.  Install and use sturdy handrails on stairways.  Wear nonskid footwear, even inside. Don't walk barefoot or in socks without shoes.        Be safe outside.   Use handrails, curb cuts, and ramps whenever possible.  Keep your hands free by using a shoulder bag or backpack.  Try to walk in well-lit areas. Watch out for uneven ground, changes in pavement, and debris.  Be careful in the winter. Walk on the grass or gravel when sidewalks are slippery. Use de-icer on steps and walkways. Add non-slip devices to shoes.    Put grab bars and nonskid mats in your shower or tub and near the toilet. Try to use a shower chair or bath bench when bathing.   Get into a tub or shower by putting in your weaker leg first. Get out with your strong side first. Have a phone or medical alert device in the bathroom with you.   Where can you learn more?  Go to https://www.[employer].net/patiented  Enter G117 in the search box to learn more about \"Preventing Falls: Care Instructions.\"  Current as of: " July 31, 2024  Content Version: 14.4    4702-6649 [employer].   Care instructions adapted under license by your healthcare professional. If you have questions about a medical condition or this instruction, always ask your healthcare professional. [employer] disclaims any warranty or liability for your use of this information.

## 2025-04-03 ENCOUNTER — PATIENT OUTREACH (OUTPATIENT)
Dept: CARE COORDINATION | Facility: CLINIC | Age: 55
End: 2025-04-03
Payer: COMMERCIAL

## 2025-04-07 ENCOUNTER — PATIENT OUTREACH (OUTPATIENT)
Dept: CARE COORDINATION | Facility: CLINIC | Age: 55
End: 2025-04-07
Payer: COMMERCIAL

## 2025-04-14 ENCOUNTER — OFFICE VISIT (OUTPATIENT)
Dept: FAMILY MEDICINE | Facility: CLINIC | Age: 55
End: 2025-04-14
Payer: COMMERCIAL

## 2025-04-14 VITALS
HEIGHT: 65 IN | SYSTOLIC BLOOD PRESSURE: 136 MMHG | OXYGEN SATURATION: 98 % | TEMPERATURE: 97.8 F | BODY MASS INDEX: 48.82 KG/M2 | WEIGHT: 293 LBS | HEART RATE: 70 BPM | DIASTOLIC BLOOD PRESSURE: 74 MMHG | RESPIRATION RATE: 14 BRPM

## 2025-04-14 DIAGNOSIS — B07.8 COMMON WART: Primary | ICD-10-CM

## 2025-04-14 DIAGNOSIS — Z23 HIGH PRIORITY FOR 2019-NCOV VACCINE: ICD-10-CM

## 2025-04-14 PROCEDURE — 91320 SARSCV2 VAC 30MCG TRS-SUC IM: CPT | Performed by: PHYSICIAN ASSISTANT

## 2025-04-14 PROCEDURE — 90480 ADMN SARSCOV2 VAC 1/ONLY CMP: CPT | Performed by: PHYSICIAN ASSISTANT

## 2025-04-14 PROCEDURE — 17110 DESTRUCTION B9 LES UP TO 14: CPT | Performed by: PHYSICIAN ASSISTANT

## 2025-04-14 NOTE — PROGRESS NOTES
"  Assessment & Plan     Common wart    Likely wart versus callus. Pared down with 10 blade. Frozen with LN2 x3. Patient tolerated procedure well.     DESTRUCT BENIGN LESION      High priority for 2019-nCoV vaccine    COVID 19 VACCINE      The longitudinal plan of care for the diagnosis(es)/condition(s) as documented were addressed during this visit. Due to the added complexity in care, I will continue to support Agatha in the subsequent management and with ongoing continuity of care.          Subjective   Agatha is a 54 year old, presenting for the following health issues:  Derm Problem      4/14/2025     8:03 AM   Additional Questions   Roomed by Irina ARMENTA        Rash  Onset/Duration: 1 week  Description  Location: R index finger  Character: raised  Itching: no  Intensity:  mild  Progression of Symptoms:  worsening  Accompanying signs and symptoms:   Fever: No  Body aches or joint pain: No  Sore throat symptoms: No  Recent cold symptoms: No  History:           Previous episodes of similar rash: None  New exposures:   broken glass a few weeks ago  Recent travel: No  Exposure to similar rash: No  Precipitating or alleviating factors: none  Therapies tried and outcome: none      Review of Systems  Constitutional, HEENT, cardiovascular, pulmonary, gi and gu systems are negative, except as otherwise noted.        Objective    /74 (BP Location: Left arm, Patient Position: Chair, Cuff Size: Adult Large)   Pulse 70   Temp 97.8  F (36.6  C) (Oral)   Resp 14   Ht 1.645 m (5' 4.75\")   Wt 134.7 kg (297 lb)   SpO2 98%   BMI 49.81 kg/m    Body mass index is 49.81 kg/m .      Physical Exam   GENERAL: alert and no distress  EYES: Eyes grossly normal to inspection, PERRL and conjunctivae and sclerae normal  MS: no gross musculoskeletal defects noted, no edema  SKIN: common wart - fingers versus callus  NEURO: Normal strength and tone, mentation intact and speech normal  PSYCH: mentation appears normal, affect " normal/bright            Signed Electronically by: Tucker Mack PA-C

## 2025-04-18 ENCOUNTER — VIRTUAL VISIT (OUTPATIENT)
Dept: ENDOCRINOLOGY | Facility: CLINIC | Age: 55
End: 2025-04-18
Payer: COMMERCIAL

## 2025-04-18 VITALS — WEIGHT: 293 LBS | HEIGHT: 64 IN | BODY MASS INDEX: 50.02 KG/M2

## 2025-04-18 DIAGNOSIS — E66.813 CLASS 3 SEVERE OBESITY WITH SERIOUS COMORBIDITY AND BODY MASS INDEX (BMI) OF 50.0 TO 59.9 IN ADULT, UNSPECIFIED OBESITY TYPE (H): Primary | ICD-10-CM

## 2025-04-18 DIAGNOSIS — E88.810 METABOLIC SYNDROME: ICD-10-CM

## 2025-04-18 PROCEDURE — 98006 SYNCH AUDIO-VIDEO EST MOD 30: CPT | Performed by: NURSE PRACTITIONER

## 2025-04-18 ASSESSMENT — PAIN SCALES - GENERAL: PAINLEVEL_OUTOF10: NO PAIN (0)

## 2025-04-18 NOTE — PROGRESS NOTES
Return Medical Weight Management Note     Shital Krueger  MRN:  [id number]  :  1970  MATTHEW:  2025    Dear Kenia Hanks PA-C,    I had the pleasure of seeing your patient Shital Krueger. She is a 54 year old female who I am continuing to see for treatment of obesity related to:        2024    11:03 AM   --   I have the following health issues associated with obesity High Blood Pressure    GERD (Reflux)   I have the following symptoms associated with obesity Knee Pain    Lower Extremity Swelling    Fatigue    Groin Rash       Assessment & Plan   Problem List Items Addressed This Visit          Digestive    Class 3 severe obesity with serious comorbidity and body mass index (BMI) of 50.0 to 59.9 in adult, unspecified obesity type - Primary     Weight stall since last seen. Persistent hunger/ cravings, minimal benefit. Some queasiness and food aversion. Some constipation. Most side effects manageable, discussed more consistent management to avoid extreme side effects. Going to try dose increase as long as she can make adjustment and tolerate dose.     Lots of stress and found it difficult to work on diet /lifestyle changes since last seen. Discussed taking smaller steps towards sustainable change. Discussed some of these goals.    Increase zepbound to 10mg - if no benefit can send message to go up to the 12.5mg   Could consider adding collagen- 20 g protein (need to eat animal protein that day for it to count)   Could consider adding multivitamin to help with hair/ skin   Increase hydration - goal is 64oz daily   Increase protein - goal is 20-30g 3 times day   Smaller portions of high fat/ high sugar foods to avoid   Can try adding fiber - start really slow - 1/2 serving daily until tolerating   Could try miralax- 1 capful daily- will help soften stool  Could try magnesium glycinate or magnesium oxylate for constipation   Follow up 3 months   Sleep referral palced  Consider follow up with dietitian    Continue weight check with PCP every 1-2 months          Relevant Medications    tirzepatide-Weight Management (ZEPBOUND) 10 MG/0.5ML prefilled pen    Other Relevant Orders    Adult Sleep Evaluation & Management  Referral       Endocrine    Metabolic syndrome    Relevant Medications    tirzepatide-Weight Management (ZEPBOUND) 10 MG/0.5ML prefilled pen    Other Relevant Orders    Adult Sleep Evaluation & Management  Referral          INTERVAL HISTORY:    New MWM 6/18/2024 very early onset weight difficulties but more difficulty after pregnancy at age 40. Unable to get below 230lb since pregnancy. Initial BMI 51 with HTN, HLD, metabolic syndrome, joint pain, fatigue, and abdominal hernia. Symptoms of sleep apnea. Discussed contrave (strong reward pathway) and wegovy/ metformin.     Last seen 1/2025     Anti-obesity medication history    Current:   Zepbound   -constipation, reduced frequency, large bowel movements, sometimes uncomfortable   -some food noise, cravings, hunger   -some queasy feeling/ aversion to some proteins    Recent diet changes:   Not really working on this right now   Some food aversion    Recent stressors: lots of stress  Mom has follow up cancer appointments    had emergency surgery     Recent sleep changes: frequent night time waking, not a lot of rest, pcp had recommended sleep referral in the past. Open to seeing them now. Discussed health benefits of treating MANNY if she has it.     CURRENT WEIGHT:   297 lbs 0 oz    Initial Weight (lbs): 309 lbs  Last Visits Weight: 134.7 kg (297 lb)  Cumulative weight loss (lbs): 12  Weight Loss Percentage: 3.88%        4/15/2025    12:49 PM   Changes and Difficulties   I have made the following changes to my diet since my last visit: Pretty much the same as our last appointment   With regards to my diet, I am still struggling with: I still have food chatter since changing rx's.   I have made the following changes to my  activity/exercise since my last visit: I have been walking more.         MEDICATIONS:   Current Outpatient Medications   Medication Sig Dispense Refill    tirzepatide-Weight Management (ZEPBOUND) 10 MG/0.5ML prefilled pen Inject 0.5 mLs (10 mg) subcutaneously every 7 days. 2 mL 2    celecoxib (CELEBREX) 200 MG capsule Take 1 capsule (200 mg) by mouth daily. 90 capsule 2    clobetasol (TEMOVATE) 0.05 % CREA cream       hydrochlorothiazide (HYDRODIURIL) 25 MG tablet Take 1 tablet (25 mg) by mouth daily as needed (edema). 90 tablet 3    loratadine (CLARITIN) 10 MG tablet Take 10 mg by mouth daily      losartan (COZAAR) 50 MG tablet Take 1 tablet (50 mg) by mouth daily. 90 tablet 3    metroNIDAZOLE (METROCREAM) 0.75 % external cream Apply topically 2 times daily. Apply to face for rosacea 45 g 1    nystatin (MYCOSTATIN) 529244 UNIT/GM external powder Apply topically 2 times daily as needed for other (for rash to breast). Apply to crease of breast two times per day x 7-10 as needed for rash 60 g 0    tirzepatide-Weight Management (ZEPBOUND) 7.5 MG/0.5ML prefilled pen Inject 0.5 mLs (7.5 mg) subcutaneously every 7 days. 2 mL 2           4/15/2025    12:49 PM   Weight Loss Medication History Reviewed With Patient   Are you having any side effects from the weight loss medication that we have prescribed you? Yes   If you are having side effects please describe: Just the normal constipation, nausea       Office Visit on 04/02/2025   Component Date Value Ref Range Status    Color Urine 04/02/2025 Yellow  Colorless, Straw, Light Yellow, Yellow Final    Appearance Urine 04/02/2025 Clear  Clear Final    Glucose Urine 04/02/2025 Negative  Negative mg/dL Final    Bilirubin Urine 04/02/2025 Negative  Negative Final    Ketones Urine 04/02/2025 Negative  Negative mg/dL Final    Specific Gravity Urine 04/02/2025 1.025  1.003 - 1.035 Final    Blood Urine 04/02/2025 Trace (A)  Negative Final    pH Urine 04/02/2025 5.5  5.0 - 7.0 Final     "Protein Albumin Urine 04/02/2025 Negative  Negative mg/dL Final    Urobilinogen Urine 04/02/2025 0.2  0.2, 1.0 E.U./dL Final    Nitrite Urine 04/02/2025 Negative  Negative Final    Leukocyte Esterase Urine 04/02/2025 Negative  Negative Final    Bacteria Urine 04/02/2025 Few (A)  None Seen /HPF Final    RBC Urine 04/02/2025 0-2  0-2 /HPF /HPF Final    WBC Urine 04/02/2025 None Seen  0-5 /HPF /HPF Final    Squamous Epithelials Urine 04/02/2025 Few (A)  None Seen /LPF Final    Mucus Urine 04/02/2025 Present (A)  None Seen /LPF Final           2/14/2022    10:34 AM 6/18/2024    10:46 AM   SUPRIYA Score (Last Two)   SUPRIYA Raw Score 40 34   Activation Score 100 68.9   SUPRIYA Level 4 3         PHYSICAL EXAM:  Objective    Ht 1.626 m (5' 4\")   Wt 134.7 kg (297 lb)   BMI 50.98 kg/m               GENERAL: alert and no distress  EYES: Eyes grossly normal to inspection.  No discharge or erythema, or obvious scleral/conjunctival abnormalities.  RESP: No audible wheeze, cough, or visible cyanosis.    SKIN: Visible skin clear. No significant rash, abnormal pigmentation or lesions.  NEURO: Cranial nerves grossly intact.  Mentation and speech appropriate for age.  PSYCH: Appropriate affect, tone, and pace of words        Sincerely,    Kenia Ferris NP      30 minutes spent by me on the date of the encounter doing chart review, history and exam, documentation and further activities per the note    The longitudinal plan of care for the diagnosis(es)/condition(s) as documented were addressed during this visit. Due to the added complexity in care, I will continue to support Agatha in the subsequent management and with ongoing continuity of care.    Virtual Visit Details    Type of service:  Video Visit     Originating Location (pt. Location): Home    Distant Location (provider location):  On-site  Platform used for Video Visit: Jones"

## 2025-04-18 NOTE — LETTER
2025       RE: Shital Krueger  [id number] [facility] [id number]     Dear Colleague,    Thank you for referring your patient, Shital Krueger, to the [facility] [place] at [facility]. Please see a copy of my visit note below.      Return Medical Weight Management Note     Shital Krueger  MRN:  [id number]  :  1970  MATTHEW:  2025    Dear Kenia Hanks PA-C,    I had the pleasure of seeing your patient Shital Krueger. She is a 54 year old female who I am continuing to see for treatment of obesity related to:        2024    11:03 AM   --   I have the following health issues associated with obesity High Blood Pressure    GERD (Reflux)   I have the following symptoms associated with obesity Knee Pain    Lower Extremity Swelling    Fatigue    Groin Rash       Assessment & Plan  Problem List Items Addressed This Visit          Digestive    Class 3 severe obesity with serious comorbidity and body mass index (BMI) of 50.0 to 59.9 in adult, unspecified obesity type - Primary     Weight stall since last seen. Persistent hunger/ cravings, minimal benefit. Some queasiness and food aversion. Some constipation. Most side effects manageable, discussed more consistent management to avoid extreme side effects. Going to try dose increase as long as she can make adjustment and tolerate dose.     Lots of stress and found it difficult to work on diet /lifestyle changes since last seen. Discussed taking smaller steps towards sustainable change. Discussed some of these goals.    Increase zepbound to 10mg - if no benefit can send message to go up to the 12.5mg   Could consider adding collagen- 20 g protein (need to eat animal protein that day for it to count)   Could consider adding multivitamin to help with hair/ skin   Increase hydration - goal is 64oz daily   Increase protein - goal is 20-30g 3 times day   Smaller  portions of high fat/ high sugar foods to avoid   Can try adding fiber - start really slow - 1/2 serving daily until tolerating   Could try miralax- 1 capful daily- will help soften stool  Could try magnesium glycinate or magnesium oxylate for constipation   Follow up 3 months   Sleep referral palced  Consider follow up with dietitian   Continue weight check with PCP every 1-2 months          Relevant Medications    tirzepatide-Weight Management (ZEPBOUND) 10 MG/0.5ML prefilled pen    Other Relevant Orders    Adult Sleep Evaluation & Management  Referral       Endocrine    Metabolic syndrome    Relevant Medications    tirzepatide-Weight Management (ZEPBOUND) 10 MG/0.5ML prefilled pen    Other Relevant Orders    Adult Sleep Evaluation & Management  Referral          INTERVAL HISTORY:    New MWM 6/18/2024 very early onset weight difficulties but more difficulty after pregnancy at age 40. Unable to get below 230lb since pregnancy. Initial BMI 51 with HTN, HLD, metabolic syndrome, joint pain, fatigue, and abdominal hernia. Symptoms of sleep apnea. Discussed contrave (strong reward pathway) and wegovy/ metformin.     Last seen 1/2025     Anti-obesity medication history    Current:   Zepbound   -constipation, reduced frequency, large bowel movements, sometimes uncomfortable   -some food noise, cravings, hunger   -some queasy feeling/ aversion to some proteins    Recent diet changes:   Not really working on this right now   Some food aversion    Recent stressors: lots of stress  Mom has follow up cancer appointments    had emergency surgery     Recent sleep changes: frequent night time waking, not a lot of rest, pcp had recommended sleep referral in the past. Open to seeing them now. Discussed health benefits of treating MANNY if she has it.     CURRENT WEIGHT:   297 lbs 0 oz    Initial Weight (lbs): 309 lbs  Last Visits Weight: 134.7 kg (297 lb)  Cumulative weight loss (lbs): 12  Weight Loss  Percentage: 3.88%        4/15/2025    12:49 PM   Changes and Difficulties   I have made the following changes to my diet since my last visit: Pretty much the same as our last appointment   With regards to my diet, I am still struggling with: I still have food chatter since changing rx's.   I have made the following changes to my activity/exercise since my last visit: I have been walking more.         MEDICATIONS:   Current Outpatient Medications   Medication Sig Dispense Refill     tirzepatide-Weight Management (ZEPBOUND) 10 MG/0.5ML prefilled pen Inject 0.5 mLs (10 mg) subcutaneously every 7 days. 2 mL 2     celecoxib (CELEBREX) 200 MG capsule Take 1 capsule (200 mg) by mouth daily. 90 capsule 2     clobetasol (TEMOVATE) 0.05 % CREA cream        hydrochlorothiazide (HYDRODIURIL) 25 MG tablet Take 1 tablet (25 mg) by mouth daily as needed (edema). 90 tablet 3     loratadine (CLARITIN) 10 MG tablet Take 10 mg by mouth daily       losartan (COZAAR) 50 MG tablet Take 1 tablet (50 mg) by mouth daily. 90 tablet 3     metroNIDAZOLE (METROCREAM) 0.75 % external cream Apply topically 2 times daily. Apply to face for rosacea 45 g 1     nystatin (MYCOSTATIN) 141880 UNIT/GM external powder Apply topically 2 times daily as needed for other (for rash to breast). Apply to crease of breast two times per day x 7-10 as needed for rash 60 g 0     tirzepatide-Weight Management (ZEPBOUND) 7.5 MG/0.5ML prefilled pen Inject 0.5 mLs (7.5 mg) subcutaneously every 7 days. 2 mL 2           4/15/2025    12:49 PM   Weight Loss Medication History Reviewed With Patient   Are you having any side effects from the weight loss medication that we have prescribed you? Yes   If you are having side effects please describe: Just the normal constipation, nausea       Office Visit on 04/02/2025   Component Date Value Ref Range Status     Color Urine 04/02/2025 Yellow  Colorless, Straw, Light Yellow, Yellow Final     Appearance Urine 04/02/2025 Clear  Clear  "Final     Glucose Urine 04/02/2025 Negative  Negative mg/dL Final     Bilirubin Urine 04/02/2025 Negative  Negative Final     Ketones Urine 04/02/2025 Negative  Negative mg/dL Final     Specific Gravity Urine 04/02/2025 1.025  1.003 - 1.035 Final     Blood Urine 04/02/2025 Trace (A)  Negative Final     pH Urine 04/02/2025 5.5  5.0 - 7.0 Final     Protein Albumin Urine 04/02/2025 Negative  Negative mg/dL Final     Urobilinogen Urine 04/02/2025 0.2  0.2, 1.0 E.U./dL Final     Nitrite Urine 04/02/2025 Negative  Negative Final     Leukocyte Esterase Urine 04/02/2025 Negative  Negative Final     Bacteria Urine 04/02/2025 Few (A)  None Seen /HPF Final     RBC Urine 04/02/2025 0-2  0-2 /HPF /HPF Final     WBC Urine 04/02/2025 None Seen  0-5 /HPF /HPF Final     Squamous Epithelials Urine 04/02/2025 Few (A)  None Seen /LPF Final     Mucus Urine 04/02/2025 Present (A)  None Seen /LPF Final           2/14/2022    10:34 AM 6/18/2024    10:46 AM   SUPRIYA Score (Last Two)   SUPRIYA Raw Score 40 34   Activation Score 100 68.9   SUPRIYA Level 4 3         PHYSICAL EXAM:  Objective   Ht 1.626 m (5' 4\")   Wt 134.7 kg (297 lb)   BMI 50.98 kg/m               GENERAL: alert and no distress  EYES: Eyes grossly normal to inspection.  No discharge or erythema, or obvious scleral/conjunctival abnormalities.  RESP: No audible wheeze, cough, or visible cyanosis.    SKIN: Visible skin clear. No significant rash, abnormal pigmentation or lesions.  NEURO: Cranial nerves grossly intact.  Mentation and speech appropriate for age.  PSYCH: Appropriate affect, tone, and pace of words        Sincerely,    Kenia Ferris NP      30 minutes spent by me on the date of the encounter doing chart review, history and exam, documentation and further activities per the note    The longitudinal plan of care for the diagnosis(es)/condition(s) as documented were addressed during this visit. Due to the added complexity in care, I will continue to support Agatha in the " subsequent management and with ongoing continuity of care.    Virtual Visit Details    Type of service:  Video Visit     Originating Location (pt. Location): Home    Distant Location (provider location):  On-site  Platform used for Video Visit: Jones      Again, thank you for allowing me to participate in the care of your patient.      Sincerely,    Kenia Ferris NP

## 2025-04-18 NOTE — NURSING NOTE
Is the patient currently in the state of [place]? YES    Visit mode:VIDEO    If the visit is dropped, the patient can be reconnected by: VIDEO VISIT: Send to e-mail at: [email or web address]    Will anyone else be joining the visit? NO  (If patient encounters technical issues they should call [phone]    How would you like to obtain your AVS? MyChart    Are changes needed to the allergy or medication list? No    Are refills needed on medications prescribed by this physician? NO    Reason for visit: CARMEN MENDOZA

## 2025-04-18 NOTE — PATIENT INSTRUCTIONS
Increase zepbound to 10mg - if no benefit can send message to go up to the 12.5mg   Could consider adding collagen- 20 g protein (need to eat animal protein that day for it to count)   Could consider adding multivitamin to help with hair/ skin   Increase hydration - goal is 64oz daily   Increase protein - goal is 20-30g 3 times day   Smaller portions of high fat/ high sugar foods to avoid   Can try adding fiber - start really slow - 1/2 serving daily until tolerating   Could try miralax- 1 capful daily- will help soften stool  Could try magnesium glycinate or magnesium oxylate for constipation   Follow up 3 months   Sleep referral palced  Consider follow up with dietitian   Continue weight check with PCP every 1-2 months

## 2025-04-20 NOTE — ASSESSMENT & PLAN NOTE
Weight stall since last seen. Persistent hunger/ cravings, minimal benefit. Some queasiness and food aversion. Some constipation. Most side effects manageable, discussed more consistent management to avoid extreme side effects. Going to try dose increase as long as she can make adjustment and tolerate dose.     Lots of stress and found it difficult to work on diet /lifestyle changes since last seen. Discussed taking smaller steps towards sustainable change. Discussed some of these goals.    Increase zepbound to 10mg - if no benefit can send message to go up to the 12.5mg   Could consider adding collagen- 20 g protein (need to eat animal protein that day for it to count)   Could consider adding multivitamin to help with hair/ skin   Increase hydration - goal is 64oz daily   Increase protein - goal is 20-30g 3 times day   Smaller portions of high fat/ high sugar foods to avoid   Can try adding fiber - start really slow - 1/2 serving daily until tolerating   Could try miralax- 1 capful daily- will help soften stool  Could try magnesium glycinate or magnesium oxylate for constipation   Follow up 3 months   Sleep referral palced  Consider follow up with dietitian   Continue weight check with PCP every 1-2 months

## 2025-04-23 ENCOUNTER — PATIENT OUTREACH (OUTPATIENT)
Dept: CARE COORDINATION | Facility: CLINIC | Age: 55
End: 2025-04-23
Payer: COMMERCIAL

## 2025-06-11 ENCOUNTER — ALLIED HEALTH/NURSE VISIT (OUTPATIENT)
Dept: FAMILY MEDICINE | Facility: CLINIC | Age: 55
End: 2025-06-11
Payer: COMMERCIAL

## 2025-06-11 VITALS
SYSTOLIC BLOOD PRESSURE: 109 MMHG | WEIGHT: 289 LBS | HEART RATE: 69 BPM | DIASTOLIC BLOOD PRESSURE: 73 MMHG | BODY MASS INDEX: 49.61 KG/M2

## 2025-06-11 DIAGNOSIS — Z01.30 BP CHECK: Primary | ICD-10-CM

## 2025-06-11 PROCEDURE — 99207 PR NO CHARGE NURSE ONLY: CPT

## 2025-06-11 NOTE — PROGRESS NOTES
Shital Krueger is a 54 year old patient who comes in today for a Blood Pressure check.  Initial BP:  /73 (BP Location: Right arm, Patient Position: Sitting, Cuff Size: Adult Large)   Pulse 69   Wt 131.1 kg (289 lb)   BMI 49.61 kg/m       69  Disposition: follow-up as previously indicated by provider and BP elevated.  Triage RN notified, patient asked to wait

## 2025-08-19 ENCOUNTER — MYC REFILL (OUTPATIENT)
Dept: ENDOCRINOLOGY | Facility: CLINIC | Age: 55
End: 2025-08-19
Payer: COMMERCIAL

## 2025-08-19 DIAGNOSIS — E66.813 CLASS 3 SEVERE OBESITY WITH SERIOUS COMORBIDITY AND BODY MASS INDEX (BMI) OF 50.0 TO 59.9 IN ADULT, UNSPECIFIED OBESITY TYPE (H): ICD-10-CM

## 2025-08-19 DIAGNOSIS — E88.810 METABOLIC SYNDROME: ICD-10-CM

## (undated) DEVICE — CLIP HEMOSTASIS ASSURANCE W16 MM BX00711884

## (undated) DEVICE — ENDO TRAP POLYP QUICK CATCH 710201

## (undated) DEVICE — ENDO SNARE EXACTO COLD 9MM LOOP 2.4MMX230CM 00711115

## (undated) DEVICE — KIT ENDO TURNOVER/PROCEDURE W/CLEAN A SCOPE LINERS 103888

## (undated) RX ORDER — FENTANYL CITRATE 50 UG/ML
INJECTION, SOLUTION INTRAMUSCULAR; INTRAVENOUS
Status: DISPENSED
Start: 2024-04-30